# Patient Record
Sex: FEMALE | Race: WHITE | Employment: OTHER | URBAN - METROPOLITAN AREA
[De-identification: names, ages, dates, MRNs, and addresses within clinical notes are randomized per-mention and may not be internally consistent; named-entity substitution may affect disease eponyms.]

---

## 2018-03-19 PROBLEM — E78.2 COMBINED HYPERLIPIDEMIA ASSOCIATED WITH TYPE 2 DIABETES MELLITUS (HCC): Status: ACTIVE | Noted: 2018-03-19

## 2018-03-19 PROBLEM — E11.69 COMBINED HYPERLIPIDEMIA ASSOCIATED WITH TYPE 2 DIABETES MELLITUS (HCC): Status: ACTIVE | Noted: 2018-03-19

## 2020-03-25 PROBLEM — E66.01 MORBIDLY OBESE (HCC): Status: ACTIVE | Noted: 2020-03-25

## 2021-06-25 PROBLEM — I21.11 STEMI INVOLVING RIGHT CORONARY ARTERY (HCC): Status: ACTIVE | Noted: 2021-06-25

## 2021-06-25 PROBLEM — R07.9 CHEST PAIN: Status: ACTIVE | Noted: 2021-06-25

## 2021-06-25 PROBLEM — I30.0 ACUTE IDIOPATHIC PERICARDITIS: Status: ACTIVE | Noted: 2021-06-25

## 2021-06-25 PROBLEM — I30.1 ACUTE VIRAL PERICARDITIS: Status: ACTIVE | Noted: 2021-06-25

## 2021-06-29 PROBLEM — I31.39 PERICARDIAL EFFUSION: Status: ACTIVE | Noted: 2021-06-29

## 2021-06-29 PROBLEM — I30.9 ACUTE PERICARDITIS: Status: ACTIVE | Noted: 2021-06-29

## 2021-06-29 PROBLEM — F41.9 ANXIETY: Status: ACTIVE | Noted: 2021-06-29

## 2021-06-30 PROBLEM — I31.9 PERICARDITIS: Status: ACTIVE | Noted: 2021-06-29

## 2021-09-20 ENCOUNTER — TELEPHONE (OUTPATIENT)
Dept: ADMINISTRATIVE | Age: 66
End: 2021-09-20

## 2021-09-20 NOTE — TELEPHONE ENCOUNTER
Pt called and requested to schedule herself and her son, Hernando Biggs, with [facility]. She said they are previous patients of his from [facility]. Please advise if they require a referral in order to schedule. Also, she would like their appointments back to back if possible. Please advise if able to schedule back to back and if referral is needed, and I will call pt back.

## 2021-10-19 ENCOUNTER — OFFICE VISIT (OUTPATIENT)
Dept: ENDOCRINOLOGY | Age: 66
End: 2021-10-19
Payer: MEDICARE

## 2021-10-19 VITALS
BODY MASS INDEX: 35.51 KG/M2 | DIASTOLIC BLOOD PRESSURE: 78 MMHG | WEIGHT: 193 LBS | OXYGEN SATURATION: 96 % | SYSTOLIC BLOOD PRESSURE: 112 MMHG | HEART RATE: 89 BPM | HEIGHT: 62 IN

## 2021-10-19 DIAGNOSIS — E66.09 CLASS 1 OBESITY DUE TO EXCESS CALORIES WITHOUT SERIOUS COMORBIDITY WITH BODY MASS INDEX (BMI) OF 34.0 TO 34.9 IN ADULT: ICD-10-CM

## 2021-10-19 DIAGNOSIS — E03.9 ACQUIRED HYPOTHYROIDISM: ICD-10-CM

## 2021-10-19 DIAGNOSIS — E11.42 TYPE 2 DIABETES MELLITUS WITH POLYNEUROPATHY (HCC): ICD-10-CM

## 2021-10-19 DIAGNOSIS — I10 ESSENTIAL HYPERTENSION: ICD-10-CM

## 2021-10-19 DIAGNOSIS — E78.2 MIXED HYPERLIPIDEMIA: ICD-10-CM

## 2021-10-19 DIAGNOSIS — E11.65 TYPE 2 DIABETES MELLITUS WITH HYPERGLYCEMIA, WITH LONG-TERM CURRENT USE OF INSULIN (HCC): Primary | ICD-10-CM

## 2021-10-19 DIAGNOSIS — Z79.4 TYPE 2 DIABETES MELLITUS WITH HYPERGLYCEMIA, WITH LONG-TERM CURRENT USE OF INSULIN (HCC): Primary | ICD-10-CM

## 2021-10-19 LAB — HBA1C MFR BLD: 5.7 %

## 2021-10-19 PROCEDURE — 83036 HEMOGLOBIN GLYCOSYLATED A1C: CPT | Performed by: CLINICAL NURSE SPECIALIST

## 2021-10-19 PROCEDURE — G8484 FLU IMMUNIZE NO ADMIN: HCPCS | Performed by: CLINICAL NURSE SPECIALIST

## 2021-10-19 PROCEDURE — 2022F DILAT RTA XM EVC RTNOPTHY: CPT | Performed by: CLINICAL NURSE SPECIALIST

## 2021-10-19 PROCEDURE — 1036F TOBACCO NON-USER: CPT | Performed by: CLINICAL NURSE SPECIALIST

## 2021-10-19 PROCEDURE — G8427 DOCREV CUR MEDS BY ELIG CLIN: HCPCS | Performed by: CLINICAL NURSE SPECIALIST

## 2021-10-19 PROCEDURE — G8417 CALC BMI ABV UP PARAM F/U: HCPCS | Performed by: CLINICAL NURSE SPECIALIST

## 2021-10-19 PROCEDURE — 3017F COLORECTAL CA SCREEN DOC REV: CPT | Performed by: CLINICAL NURSE SPECIALIST

## 2021-10-19 PROCEDURE — 99214 OFFICE O/P EST MOD 30 MIN: CPT | Performed by: CLINICAL NURSE SPECIALIST

## 2021-10-19 PROCEDURE — 3044F HG A1C LEVEL LT 7.0%: CPT | Performed by: CLINICAL NURSE SPECIALIST

## 2021-10-19 RX ORDER — INSULIN GLARGINE 100 [IU]/ML
8 INJECTION, SOLUTION SUBCUTANEOUS NIGHTLY
Qty: 15 PEN | Refills: 3
Start: 2021-10-19 | End: 2022-03-31

## 2021-10-19 NOTE — PROGRESS NOTES
[address] [facility] Department of Endocrinology Diabetes and Metabolism   [address] [facility] [address]   Phone: [phone]  Fax: [phone]    Date of Service: 10/19/2021    Primary Care Physician: Chantal Mustafa MD  Referring physician: No ref. provider found  Provider: Beverly HUMPHREYS    Reason for the visit:      History of Present Illness: The history is provided by the patient. No  was used. Accuracy of the patient data is excellent. Juan Loyd is a very pleasant 61 y.o. female seen today for diabetes management     Juan Loyd was diagnosed with diabetes at age of 30    and currently on metformin 1000 mg twice daily, glipizide 5 mg a morning and 10 mg at dinner, Victoza 1.8 mg daily. Lantus pen 10 units at night   Stop Blair Rosemarie in the past due to UTI. Stopped Actos November 2015  Checks BG 2 times per day   Average 154   The patient has been checking blood sugar    . Most recent A1c results summarized below  Lab Results   Component Value Date    LABA1C 5.7 10/19/2021    LABA1C 6.6 06/25/2021    LABA1C 6.4 06/14/2021     Patient reported infrequent hypoglycemic episodes     The patient has been mindful of what has been eating and following diabetic diet as encouraged    I reviewed current medications and the patient has no issues with diabetes medications   Last eye exam was 5/21  , no h/o diabetic retinopathy  And also performs  own feet care  Microvascular complications:  No Retinopathy, Nephropathy.  +  Neuropathy   Macrovascular complications: no CAD, PVD, or Stroke    No Hx of pancreatitis   No HX of UTI/  She has had 3-4 yeast infections since starting Farxiga. None since stopping farxiga   No hx of MTC  No Hx of gastroparesis       Hypothyroidism-  1st dx at when she was in her 21's. Context:  Severe fatigue. Now on LT4 125 mcg 1 tablet daily. Does takes with other medication. Good compliance.   Pt complaining of denies related to thyroid   Lab Results   Component Value Date    TSH 1.909 02/22/2021    T4FREE 1.55 10/15/2019       PAST MEDICAL HISTORY   Past Medical History:   Diagnosis Date    Acute viral pericarditis     Anxiety     B[facility] disorder ([facility]     Depression     Diabetes ([facility]     GERD (gastroesophageal reflux disease)     Hyperlipidemia     Hypertension     Hypothyroidism     Urinary incontinence        PAST SURGICAL HISTORY   Past Surgical History:   Procedure Laterality Date    CATARACT REMOVAL Bilateral     EYE SURGERY Left     TOOTH EXTRACTION         SOCIAL HISTORY   Tobacco:   reports that she has never smoked. She has never used smokeless tobacco.  Alcohol:   reports no history of alcohol use. Drugs:   reports no history of drug use.     FAMILY HISTORY   Family History   Problem Relation Age of Onset    Diabetes Mother     Heart Disease Mother     Thyroid Disease Mother     Cancer Father     Cancer Sister     Diabetes Brother        ALLERGIES AND DRUG REACTIONS   Allergies   Allergen Reactions    Colchicine Other (See Comments)     Chest pain and discomfort and throat and stomach pain - reported by patient 9/7/21    Gabapentin      \"they told me not to take it because it gives me CP\"       CURRENT MEDICATIONS   Current Outpatient Medications   Medication Sig Dispense Refill    insulin glargine (LANTUS SOLOSTAR) 100 UNIT/ML injection pen Inject 8 Units into the skin nightly 15 pen 3    VICTOZA 18 MG/3ML SOPN SC injection inject 1.8 milligram subcutaneously daily 9 mL 3    levothyroxine (SYNTHROID) 125 MCG tablet take 1 tablet by mouth once daily 90 tablet 1    glipiZIDE (GLUCOTROL) 5 MG tablet take 1 tablet by mouth WITH BREAKFAST AND 2 TABLETS WITH DINNER 270 tablet 2    metFORMIN (GLUCOPHAGE) 1000 MG tablet TAKE 1 TABLET BY MOUTH TWICE DAILY WITH MEALS 180 tablet 3    ibuprofen (ADVIL;MOTRIN) 600 MG tablet Take 1 tablet by mouth every 6 hours as needed for Pain 90 tablet 2    zinc gluconate 50 MG tablet Take 50 mg by mouth daily      Ascorbic Acid (VITAMIN C PO) Take 500 mg by mouth      esomeprazole (NEXIUM) 40 MG delayed release capsule take 1 capsule by mouth once daily 90 capsule 1    oxybutynin (DITROPAN) 5 MG tablet take 1 tablet by mouth every morning then take 2 tablets at bedtime 270 tablet 1    captopril (CAPOTEN) 50 MG tablet take 1 tablet by mouth twice a day 180 tablet 1    atorvastatin (LIPITOR) 20 MG tablet Take 1 tablet by mouth daily 90 tablet 1    tiZANidine (ZANAFLEX) 2 MG tablet take 1 tablet by mouth nightly if needed for muscle spasm 90 tablet 1    DULoxetine (CYMBALTA) 60 MG extended release capsule take 1 capsule by mouth twice a day 180 capsule 1    citalopram (CELEXA) 20 MG tablet take 1 tablet by mouth once daily 90 tablet 1    blood glucose test strips (FREESTYLE LITE) strip TEST three times DAILY 300 strip 3    Misc.  Devices (ROLLATOR ULTRA-LIGHT) MISC 1 each by Does not apply route daily 1 each 0    magnesium oxide (MAG-OX) 400 (240 Mg) MG tablet Take 1 tablet by mouth daily 90 tablet 1    ferrous sulfate (IRON 325) 325 (65 Fe) MG tablet Take 1 tablet by mouth daily (with breakfast) 90 tablet 3    sucralfate (CARAFATE) 1 GM/10ML suspension Take 10 mLs by mouth 4 times daily 1200 mL 3    aspirin 81 MG chewable tablet Take 1 tablet by mouth daily 30 tablet 3    Continuous Blood Gluc Sensor (FREESTYLE SHAGUFTA 2 SENSOR) MISC 1 applicator by Does not apply route every 14 days (Patient not taking: Reported on 10/19/2021) 2 each 3    Insulin Pen Needle (EASY TOUCH PEN NEEDLES) 31G X 5 MM MISC USE DAILY AS DIRECTED 200 each 3    cyanocobalamin ([employer] VITAMIN B12) 1000 MCG tablet Take 1 tablet by mouth daily 90 tablet 3    Handicap Placard MISC by Does not apply route DURATION: 5 years 1 each 0    Blood Glucose Monitoring Suppl (FREESTYLE FREEDOM LITE) w/Device KIT 1 kit by Does not apply route daily 1 kit 1    fexofenadine (ALLEGRA ALLERGY) 60 MG tablet Take 1 tablet by mouth daily 90 tablet 1     No current facility-administered medications for this visit. Review of Systems  Constitutional: No fever, no chills, no diaphoresis, no generalized weakness. HEENT: No blurred vision, No sore throat, no ear pain, no hair loss  Neck: denied any neck swelling, difficulty swallowing,   Cardio-pulmonary: No CP, SOB or palpitation, No orthopnea or PND. No cough or wheezing. GI: No N/V/D, no constipation, No abdominal pain, no melena or hematochezia   : Denied any dysuria, hematuria, flank pain, discharge, or incontinence. Skin: denied any rash, ulcer, Hirsute, or hyperpigmentation. MSK: denied any joint deformity, joint pain/swelling, muscle pain, or back pain. Neuro: no numbness, no tingling, no weakness, _    OBJECTIVE    /78   Pulse 89   Ht 5' 2\" (1.575 m)   Wt 193 lb (87.5 kg)   SpO2 96%   BMI 35.30 kg/m²   BP Readings from Last 4 Encounters:   10/19/21 112/78   10/03/21 131/84   09/30/21 124/84   09/15/21 122/73     Wt Readings from Last 6 Encounters:   10/19/21 193 lb (87.5 kg)   10/03/21 190 lb (86.2 kg)   09/30/21 187 lb 3.2 oz (84.9 kg)   09/07/21 191 lb (86.6 kg)   09/02/21 189 lb 6.4 oz (85.9 kg)   08/29/21 193 lb (87.5 kg)       Physical examination:  General: awake alert, oriented x3, no abnormal position or movements. HEENT: normocephalic non-traumatic, no exophthalmos   Neck: supple, no LN enlargement, no thyromegaly, no thyroid tenderness, no JVD. Pulm: Clear equal air entry no added sounds, no wheezing or rhonchi    CVS: S1 + S2, no murmur, no heave. Dorsalis pedis pulse palpable   Abd: soft lax, no tenderness, no organomegaly, audible bowel sounds. Skin: warm, no lesions, no rash. No callus, no Ulcers, No acanthosis nigricans  Musculoskeletal: No back tenderness, no kyphosis/scoliosis    Neuro: CN intact, Monofilament sensation normal  bilateral , muscle power normal  Psych: normal mood, and affect      Review of Laboratory Data:   I personally reviewed the following lab:  Lab Results   Component Value Date/Time    WBC 12.3 (H) 10/04/2021 12:18 AM    RBC 4.49 10/04/2021 12:18 AM    HGB 13.0 10/04/2021 12:18 AM    HCT 37.9 10/04/2021 12:18 AM    MCV 84.5 10/04/2021 12:18 AM    MCH 28.9 10/04/2021 12:18 AM    MCHC 34.2 10/04/2021 12:18 AM    RDW 17.5 (H) 10/04/2021 12:18 AM     10/04/2021 12:18 AM    MPV 6.8 (L) 10/04/2021 12:18 AM    GRANULOCYTES 83.2 (H) 10/04/2021 12:18 AM    BANDS 1 08/10/2021 03:31 PM      Lab Results   Component Value Date/Time     (L) 10/04/2021 12:18 AM    K 4.2 10/04/2021 12:18 AM    CO2 24 10/04/2021 12:18 AM    BUN 15 10/04/2021 12:18 AM    CREATININE 0.59 10/04/2021 12:18 AM    CALCIUM 9.8 10/04/2021 12:18 AM    LABGLOM >60 02/04/2016 10:55 AM    GFRAA >60 02/04/2016 10:55 AM      Lab Results   Component Value Date/Time    TSH 1.909 02/22/2021 09:52 AM    T4FREE 1.55 10/15/2019 02:35 PM     Lab Results   Component Value Date    LABA1C 5.7 10/19/2021    GLUCOSE 145 10/04/2021    MALBCR see below 02/22/2021    LABMICR 1.80 02/04/2016    LABCREA 48 02/04/2016     Lab Results   Component Value Date    LABA1C 5.7 10/19/2021    LABA1C 6.6 06/25/2021    LABA1C 6.4 06/14/2021     Lab Results   Component Value Date    TRIG 144 06/25/2021    HDL 38 06/25/2021    LDLCALC 86 02/04/2016    CHOL 137 06/25/2021    CHOL 160 02/04/2016     Lab Results   Component Value Date    VITD25 36.60 04/14/2016       ASSESSMENT & RECOMMENDATIONS   Josef Ada, a 61 y.o.-old female seen in for the following issues       Assessment:      Diagnosis Orders   1. Type 2 diabetes mellitus with hyperglycemia, with long-term current use of insulin (HCC)  insulin glargine (LANTUS SOLOSTAR) 100 UNIT/ML injection pen    POCT glycosylated hemoglobin (Hb A1C)   2. Acquired hypothyroidism     3. Class 1 obesity due to excess calories without serious comorbidity with body mass index (BMI) of 34.0 to 34.9 in adult     4.  Mixed hyperlipidemia 5. Essential hypertension     6. Type 2 diabetes mellitus with polyneuropathy ([facility]         Plan:     1. Type 2 diabetes mellitus with hyperglycemia, with long-term current use of insulin (HCC)   · Patient's diabetes irritable but usually stable. Hemoglobin A1c 5.7%. · Plan: Decrease Lantus to 8 units at night  · Continue other medication as above  · The patient was advised to check blood sugars 2 times a day   · Advised to call if blood glucose less than 70 or greater than 300 3 consecutive times  · Discussed with patient A1c and blood sugar goals   · Optimal blood sugars: 100-140 pre-prandial, < 180 peak post-prandial  · The patient counseled about the complications of uncontrolled diabetes   · Patient was counselled about the importance of self-blood glucose monitoring and eating consistent carb diet to avoid blood sugar fluctuations   · Discussed lifestyle changes including diet and exercise with patient; recommended 150 minutes of moderate intensity exercise per week. 2. Acquired hypothyroidism   Continue levothyroxine 125 mcg 1 tablet once daily. Last thyroid function within normal limits. Patient taking on an empty stomach least 30 minutes before breakfast and without combination of other medications   3. Class 1 obesity due to excess calories without serious comorbidity with body mass index (BMI) of 34.0 to 34.9 in adult   Discussed lifestyle changes including diet and exercise with patient in depth. Also discussed with patient cardiovascular risk associated with obesity   4. Mixed hyperlipidemia   Continue statin   5. Essential hypertension   BP stable following with PCP   6. Type 2 diabetes mellitus with polyneuropathy ([facility]   Counseled on optimal foot care         I personally spent greater than 30 minutes reviewing  external notes from PCP and other patient's care team providers, and personally interpreted labs associated with the above diagnosis.  I also ordered labs to further assess and

## 2021-12-06 DIAGNOSIS — E11.65 TYPE 2 DIABETES MELLITUS WITH HYPERGLYCEMIA, WITH LONG-TERM CURRENT USE OF INSULIN (HCC): ICD-10-CM

## 2021-12-06 DIAGNOSIS — Z79.4 TYPE 2 DIABETES MELLITUS WITH HYPERGLYCEMIA, WITH LONG-TERM CURRENT USE OF INSULIN (HCC): ICD-10-CM

## 2021-12-06 RX ORDER — BLOOD-GLUCOSE METER
KIT MISCELLANEOUS
Qty: 300 STRIP | Refills: 5 | Status: SHIPPED
Start: 2021-12-06 | End: 2021-12-13

## 2021-12-13 DIAGNOSIS — Z79.4 TYPE 2 DIABETES MELLITUS WITH HYPERGLYCEMIA, WITH LONG-TERM CURRENT USE OF INSULIN (HCC): Primary | ICD-10-CM

## 2021-12-13 DIAGNOSIS — E11.65 TYPE 2 DIABETES MELLITUS WITH HYPERGLYCEMIA, WITH LONG-TERM CURRENT USE OF INSULIN (HCC): Primary | ICD-10-CM

## 2021-12-13 RX ORDER — LANCETS 30 GAUGE
EACH MISCELLANEOUS
Qty: 150 EACH | Refills: 6 | Status: SHIPPED
Start: 2021-12-13 | End: 2022-10-18

## 2021-12-13 RX ORDER — BLOOD-GLUCOSE METER
EACH MISCELLANEOUS
Qty: 1 KIT | Refills: 0 | Status: SHIPPED | OUTPATIENT
Start: 2021-12-13

## 2021-12-13 NOTE — TELEPHONE ENCOUNTER
Patient requested through my chart, her strips no longer are covered. I called pharmacy and they stated to send in new script for Verio one touch.

## 2022-03-14 DIAGNOSIS — E11.65 TYPE 2 DIABETES MELLITUS WITH HYPERGLYCEMIA, WITH LONG-TERM CURRENT USE OF INSULIN (HCC): ICD-10-CM

## 2022-03-14 DIAGNOSIS — Z79.4 TYPE 2 DIABETES MELLITUS WITH HYPERGLYCEMIA, WITH LONG-TERM CURRENT USE OF INSULIN (HCC): ICD-10-CM

## 2022-03-14 RX ORDER — LIRAGLUTIDE 6 MG/ML
INJECTION SUBCUTANEOUS
Qty: 9 ML | Refills: 3 | Status: SHIPPED
Start: 2022-03-14 | End: 2022-07-08

## 2022-03-31 ENCOUNTER — OFFICE VISIT (OUTPATIENT)
Dept: ENDOCRINOLOGY | Age: 67
End: 2022-03-31
Payer: MEDICARE

## 2022-03-31 VITALS
HEIGHT: 62 IN | DIASTOLIC BLOOD PRESSURE: 81 MMHG | SYSTOLIC BLOOD PRESSURE: 123 MMHG | BODY MASS INDEX: 36.25 KG/M2 | WEIGHT: 197 LBS | HEART RATE: 72 BPM | OXYGEN SATURATION: 97 % | RESPIRATION RATE: 18 BRPM

## 2022-03-31 DIAGNOSIS — E78.2 MIXED HYPERLIPIDEMIA: ICD-10-CM

## 2022-03-31 DIAGNOSIS — E03.9 ACQUIRED HYPOTHYROIDISM: ICD-10-CM

## 2022-03-31 DIAGNOSIS — E11.65 TYPE 2 DIABETES MELLITUS WITH HYPERGLYCEMIA, WITH LONG-TERM CURRENT USE OF INSULIN (HCC): Primary | ICD-10-CM

## 2022-03-31 DIAGNOSIS — E11.42 TYPE 2 DIABETES MELLITUS WITH POLYNEUROPATHY (HCC): ICD-10-CM

## 2022-03-31 DIAGNOSIS — Z79.4 TYPE 2 DIABETES MELLITUS WITH HYPERGLYCEMIA, WITH LONG-TERM CURRENT USE OF INSULIN (HCC): Primary | ICD-10-CM

## 2022-03-31 DIAGNOSIS — I10 ESSENTIAL HYPERTENSION: ICD-10-CM

## 2022-03-31 DIAGNOSIS — E66.09 CLASS 2 OBESITY DUE TO EXCESS CALORIES WITHOUT SERIOUS COMORBIDITY WITH BODY MASS INDEX (BMI) OF 35.0 TO 35.9 IN ADULT: ICD-10-CM

## 2022-03-31 LAB — HBA1C MFR BLD: 5.5 %

## 2022-03-31 PROCEDURE — 99214 OFFICE O/P EST MOD 30 MIN: CPT | Performed by: CLINICAL NURSE SPECIALIST

## 2022-03-31 PROCEDURE — 83036 HEMOGLOBIN GLYCOSYLATED A1C: CPT | Performed by: CLINICAL NURSE SPECIALIST

## 2022-03-31 PROCEDURE — 3017F COLORECTAL CA SCREEN DOC REV: CPT | Performed by: CLINICAL NURSE SPECIALIST

## 2022-03-31 PROCEDURE — G8427 DOCREV CUR MEDS BY ELIG CLIN: HCPCS | Performed by: CLINICAL NURSE SPECIALIST

## 2022-03-31 PROCEDURE — 3044F HG A1C LEVEL LT 7.0%: CPT | Performed by: CLINICAL NURSE SPECIALIST

## 2022-03-31 PROCEDURE — G8484 FLU IMMUNIZE NO ADMIN: HCPCS | Performed by: CLINICAL NURSE SPECIALIST

## 2022-03-31 PROCEDURE — G8417 CALC BMI ABV UP PARAM F/U: HCPCS | Performed by: CLINICAL NURSE SPECIALIST

## 2022-03-31 PROCEDURE — 2022F DILAT RTA XM EVC RTNOPTHY: CPT | Performed by: CLINICAL NURSE SPECIALIST

## 2022-03-31 PROCEDURE — 1036F TOBACCO NON-USER: CPT | Performed by: CLINICAL NURSE SPECIALIST

## 2022-03-31 RX ORDER — GLIPIZIDE 5 MG/1
TABLET ORAL
Qty: 180 TABLET | Refills: 2 | Status: SHIPPED
Start: 2022-03-31 | End: 2022-10-21

## 2022-03-31 NOTE — PROGRESS NOTES
[address] [place] [address] Department of Endocrinology Diabetes and Metabolism   [address] [facility] [address]   Phone: [phone]  Fax: [phone]    Date of Service: 3/31/2022    Primary Care Physician: Alcira Cline MD  Referring physician: No ref. provider found  Provider: Mario HUMPHREYS    Reason for the visit:      History of Present Illness: The history is provided by the patient. No  was used. Accuracy of the patient data is excellent. Stephenie Loomis is a very pleasant 61 y.o. female seen today for diabetes management     Stephenie Loomis was diagnosed with diabetes at age of 30    and currently on metformin 1000 mg twice daily, glipizide 5 mg a morning and 10 mg at dinner, Victoza 1.8 mg daily. Lantus pen 5 units at night   Stop Clifm [place] in the past due to UTI. Stopped Actos November 2015  Checks BG 2 times per day   Average 123 . Most recent A1c results summarized below  Lab Results   Component Value Date    LABA1C 5.5 03/31/2022    LABA1C 5.7 10/19/2021    LABA1C 6.6 06/25/2021     Patient reported infrequent hypoglycemic episodes usually in am      The patient has been mindful of what has been eating and following diabetic diet as encouraged    I reviewed current medications and the patient has no issues with diabetes medications   Last eye exam was 5/21  , no h/o diabetic retinopathy  And also performs  own feet care  Microvascular complications:  No Retinopathy, Nephropathy.  +  Neuropathy   Macrovascular complications: no CAD, PVD, or Stroke    No Hx of pancreatitis   No HX of UTI/  She has had 3-4 yeast infections since starting Farxiga. None since stopping farxiga   No hx of MTC  No Hx of gastroparesis       Hypothyroidism-  1st dx at when she was in her 19's. Context:  Severe fatigue. Now on LT4 125 mcg 1 tablet daily. Does takes with other medication. Good compliance.   Pt complaining of denies related to thyroid   Lab Results Component Value Date    TSH 1.909 02/22/2021    T4FREE 1.55 10/15/2019       PAST MEDICAL HISTORY   Past Medical History:   Diagnosis Date    Acute viral pericarditis     Anxiety     Bipolar disorder ([facility]     Depression     Diabetes ([facility]     GERD (gastroesophageal reflux disease)     Hyperlipidemia     Hypertension     Hypothyroidism     Urinary incontinence        PAST SURGICAL HISTORY   Past Surgical History:   Procedure Laterality Date    CATARACT REMOVAL Bilateral     EYE SURGERY Left     TOOTH EXTRACTION         SOCIAL HISTORY   Tobacco:   reports that she has never smoked. She has never used smokeless tobacco.  Alcohol:   reports no history of alcohol use. Drugs:   reports no history of drug use.     FAMILY HISTORY   Family History   Problem Relation Age of Onset    Diabetes Mother     Heart Disease Mother     Thyroid Disease Mother     Cancer Father     Cancer Sister     Diabetes Brother        ALLERGIES AND DRUG REACTIONS   Allergies   Allergen Reactions    Colchicine Other (See Comments)     Chest pain and discomfort and throat and stomach pain - reported by patient 9/7/21    Gabapentin      \"they told me not to take it because it gives me CP\"       CURRENT MEDICATIONS   Current Outpatient Medications   Medication Sig Dispense Refill    glipiZIDE (GLUCOTROL) 5 MG tablet take 1 tablet by mouth WITH BREAKFAST AND 1 TABLET WITH DINNER 180 tablet 2    metFORMIN (GLUCOPHAGE) 1000 MG tablet TAKE 1 TABLET BY MOUTH TWICE DAILY WITH MEALS 180 tablet 3    citalopram (CELEXA) 20 MG tablet take 1 tablet by mouth once daily 90 tablet 1    DULoxetine (CYMBALTA) 60 MG extended release capsule take 1 capsule by mouth twice a day 180 capsule 1    Liraglutide (VICTOZA) 18 MG/3ML SOPN SC injection Inject 1.8 milligrams subcutaneously daily 9 mL 3    oxybutynin (DITROPAN) 5 MG tablet take 1 tablet by mouth every morning then take 2 tablets at bedtime 270 tablet 1    FEROSUL 325 (65 Fe) MG tablet take 1 tablet by mouth once daily WITH BREAKFAST 90 tablet 3    magnesium oxide (MAG-OX) 400 MG tablet take 1 tablet by mouth once daily 90 tablet 1    Blood Glucose Monitoring Suppl (ONETOUCH VERIO) w/Device KIT Use to check blood sugars 4x daily 1 kit 0    blood glucose test strips (ASCENSIA AUTODISC VI;ONE TOUCH ULTRA TEST VI) strip 1 each by In Vitro route daily Use to check blood sugars 4x daily 150 each 6    OneTouch Delica Lancets 46U MISC Use to check blood sugars 4x daily 150 each 6    atorvastatin (LIPITOR) 20 MG tablet TAKE 1 TABLET BY MOUTH  DAILY 90 tablet 3    captopril (CAPOTEN) 50 MG tablet take 1 tablet by mouth twice a day 180 tablet 1    zinc gluconate 50 MG tablet Take 50 mg by mouth daily      Ascorbic Acid (VITAMIN C PO) Take 500 mg by mouth      esomeprazole (NEXIUM) 40 MG delayed release capsule take 1 capsule by mouth once daily 90 capsule 1    tiZANidine (ZANAFLEX) 2 MG tablet take 1 tablet by mouth nightly if needed for muscle spasm 90 tablet 1    levothyroxine (SYNTHROID) 125 MCG tablet take 1 tablet by mouth once daily 90 tablet 1    Misc.  Devices (ROLLATOR ULTRA-LIGHT) MISC 1 each by Does not apply route daily 1 each 0    sucralfate (CARAFATE) 1 GM/10ML suspension Take 10 mLs by mouth 4 times daily 1200 mL 3    aspirin 81 MG chewable tablet Take 1 tablet by mouth daily 30 tablet 3    Continuous Blood Gluc Sensor (FREESTYLE SHAGUFTA 2 SENSOR) MISC 1 applicator by Does not apply route every 14 days 2 each 3    Insulin Pen Needle (EASY TOUCH PEN NEEDLES) 31G X 5 MM MISC USE DAILY AS DIRECTED 200 each 3    cyanocobalamin ([employer] VITAMIN B12) 1000 MCG tablet Take 1 tablet by mouth daily 90 tablet 3    Handicap Placard MISC by Does not apply route DURATION: 5 years 1 each 0    Blood Glucose Monitoring Suppl (FREESTYLE FREEDOM LITE) w/Device KIT 1 kit by Does not apply route daily 1 kit 1    fexofenadine (ALLEGRA ALLERGY) 60 MG tablet Take 1 tablet by mouth daily 90 tablet 1    ibuprofen (ADVIL;MOTRIN) 400 MG tablet TAKE 1 TABLET BY MOUTH  DAILY 90 tablet 3    ibuprofen (ADVIL;MOTRIN) 600 MG tablet Take 1 tablet by mouth every 6 hours as needed for Pain 90 tablet 2     No current facility-administered medications for this visit. Review of Systems  Constitutional: No fever, no chills, no diaphoresis, no generalized weakness. HEENT: No blurred vision, No sore throat, no ear pain, no hair loss  Neck: denied any neck swelling, difficulty swallowing,   Cardio-pulmonary: No CP, SOB or palpitation, No orthopnea or PND. No cough or wheezing. GI: No N/V/D, no constipation, No abdominal pain, no melena or hematochezia   : Denied any dysuria, hematuria, flank pain, discharge, or incontinence. Skin: denied any rash, ulcer, Hirsute, or hyperpigmentation. MSK: denied any joint deformity, joint pain/swelling, muscle pain, or back pain. Neuro: no numbness, no tingling, no weakness, _    OBJECTIVE    /81   Pulse 72   Resp 18   Ht 5' 2\" (1.575 m)   Wt 197 lb (89.4 kg)   SpO2 97%   BMI 36.03 kg/m²   BP Readings from Last 4 Encounters:   03/31/22 123/81   01/06/22 137/71   11/22/21 118/75   10/19/21 112/78     Wt Readings from Last 6 Encounters:   03/31/22 197 lb (89.4 kg)   01/06/22 193 lb (87.5 kg)   11/22/21 193 lb (87.5 kg)   10/19/21 193 lb (87.5 kg)   10/03/21 190 lb (86.2 kg)   09/30/21 187 lb 3.2 oz (84.9 kg)       Physical examination:  General: awake alert, oriented x3, no abnormal position or movements. HEENT: normocephalic non-traumatic, no exophthalmos   Neck: supple, no LN enlargement, no thyromegaly, no thyroid tenderness, no JVD. Pulm: Clear equal air entry no added sounds, no wheezing or rhonchi    CVS: S1 + S2, no murmur, no heave. Dorsalis pedis pulse palpable   Abd: soft lax, no tenderness, no organomegaly, audible bowel sounds. Skin: warm, no lesions, no rash.  No callus, no Ulcers, No acanthosis nigricans  Musculoskeletal: No back tenderness, no kyphosis/scoliosis    Neuro: CN intact, Monofilament sensation normal  bilateral , muscle power normal  Psych: normal mood, and affect      Review of Laboratory Data:  I personally reviewed the following lab:  Lab Results   Component Value Date/Time    WBC 8.8 01/05/2022 02:32 PM    RBC 4.45 01/05/2022 02:32 PM    HGB 13.0 01/05/2022 02:32 PM    HCT 38.8 01/05/2022 02:32 PM    MCV 87.1 01/05/2022 02:32 PM    MCH 29.1 01/05/2022 02:32 PM    MCHC 33.4 01/05/2022 02:32 PM    RDW 16.9 (H) 01/05/2022 02:32 PM     01/05/2022 02:32 PM    MPV 7.2 (L) 01/05/2022 02:32 PM    GRANULOCYTES 79.9 01/05/2022 02:32 PM    BANDS 1 08/10/2021 03:31 PM      Lab Results   Component Value Date/Time     (L) 01/05/2022 02:32 PM    K 4.7 01/13/2022 10:29 AM    CO2 27 01/05/2022 02:32 PM    BUN 18 01/05/2022 02:32 PM    CREATININE 0.62 01/05/2022 02:32 PM    CALCIUM 9.9 01/05/2022 02:32 PM    LABGLOM >60 02/04/2016 10:55 AM    GFRAA >60 02/04/2016 10:55 AM      Lab Results   Component Value Date/Time    TSH 1.909 02/22/2021 09:52 AM    T4FREE 1.55 10/15/2019 02:35 PM     Lab Results   Component Value Date    LABA1C 5.5 03/31/2022    GLUCOSE 143 01/05/2022    MALBCR see below 02/22/2021    LABMICR 1.80 02/04/2016    LABCREA 48 02/04/2016     Lab Results   Component Value Date    LABA1C 5.5 03/31/2022    LABA1C 5.7 10/19/2021    LABA1C 6.6 06/25/2021     Lab Results   Component Value Date    TRIG 144 06/25/2021    HDL 38 06/25/2021    LDLCALC 86 02/04/2016    CHOL 137 06/25/2021    CHOL 160 02/04/2016     Lab Results   Component Value Date    VITD25 36.60 04/14/2016       ASSESSMENT & RECOMMENDATIONS   Red Dominguez, a 61 y.o.-old female seen in for the following issues       Assessment:      Diagnosis Orders   1. Type 2 diabetes mellitus with hyperglycemia, with long-term current use of insulin (HCC)  POCT glycosylated hemoglobin (Hb A1C)    glipiZIDE (GLUCOTROL) 5 MG tablet    metFORMIN (GLUCOPHAGE) 1000 MG tablet   2. Acquired hypothyroidism     3. Class 2 obesity due to excess calories without serious comorbidity with body mass index (BMI) of 35.0 to 35.9 in adult     4. Mixed hyperlipidemia     5. Essential hypertension     6. Type 2 diabetes mellitus with polyneuropathy ([facility]         Plan:     1. Type 2 diabetes mellitus with hyperglycemia, with long-term current use of insulin ([facility]   · Patient's diabetes well controlled . Hemoglobin A1c 5.5%. · Plan: Stop Lantus   · Decrease glipizide dose to 5 mg 1 tablet twice daily   · Continue metformin 1000 mg twice daily  · Continue Victoza 1.8 mg daily  · The patient was advised to check blood sugars 2 times a day   · Advised to call if blood glucose less than 70 or greater than 300 3 consecutive times  · Discussed with patient A1c and blood sugar goals   · Discussed lifestyle changes including diet and exercise with patient; recommended 150 minutes of moderate intensity exercise per week. 2. Acquired hypothyroidism   Continue levothyroxine 125 mcg 1 tablet once daily. Last thyroid function within normal limits. Patient taking on an empty stomach least 30 minutes before breakfast and without combination of other medications   3. Class 1 obesity due to excess calories without serious comorbidity with body mass index (BMI) of 35.0 to 35.9 in adult   Discussed lifestyle changes including diet and exercise with patient in depth. Also discussed with patient cardiovascular risk associated with obesity   4. Mixed hyperlipidemia   Continue statin. Counseled on the importance of statin therapy with diabetes   5. Essential hypertension   BP stable following with PCP   6. Type 2 diabetes mellitus with polyneuropathy ([facility]   Counseled on optimal foot care         I personally spent greater than 30 minutes reviewing  external notes from PCP and other patient's care team providers, and personally interpreted labs associated with the above diagnosis.  I also ordered labs to further assess and manage the above addressed medical conditions. Return in about 3 months (around 6/30/2022). The above issues were reviewed with the patient who understood and agreed with the plan. Thank you for allowing us to participate in the care of this patient. Please do not hesitate to contact us with any additional questions. Yen HUMPHREYS    [facility]   [address] [place] [id number]   Phone: [phone]  Fax: [phone]  --------------------------------------------  An electronic signature was used to authenticate this note.  Yen HUMPHREYS on 3/31/2022 at 2:34 PM

## 2022-05-05 ENCOUNTER — TELEPHONE (OUTPATIENT)
Dept: ENDOCRINOLOGY | Age: 67
End: 2022-05-05

## 2022-05-05 NOTE — TELEPHONE ENCOUNTER
Alison Zarate called in asking Deya Peña to fill all her prescription due to her PCP changing practices and her needing to find a new PCP. Deya Peña asked me to relay to her that he can only refill the medications he sees her for for diabetes. She said okay and I went over her diabetic medications and she has refills for next several months.

## 2022-07-07 DIAGNOSIS — E11.65 TYPE 2 DIABETES MELLITUS WITH HYPERGLYCEMIA, WITH LONG-TERM CURRENT USE OF INSULIN (HCC): ICD-10-CM

## 2022-07-07 DIAGNOSIS — Z79.4 TYPE 2 DIABETES MELLITUS WITH HYPERGLYCEMIA, WITH LONG-TERM CURRENT USE OF INSULIN (HCC): ICD-10-CM

## 2022-07-08 RX ORDER — LIRAGLUTIDE 6 MG/ML
INJECTION SUBCUTANEOUS
Qty: 9 ML | Refills: 3 | Status: SHIPPED
Start: 2022-07-08 | End: 2022-11-02

## 2022-10-05 ENCOUNTER — OFFICE VISIT (OUTPATIENT)
Dept: ENDOCRINOLOGY | Age: 67
End: 2022-10-05
Payer: MEDICARE

## 2022-10-05 VITALS
BODY MASS INDEX: 36.62 KG/M2 | DIASTOLIC BLOOD PRESSURE: 81 MMHG | WEIGHT: 199 LBS | HEART RATE: 104 BPM | HEIGHT: 62 IN | SYSTOLIC BLOOD PRESSURE: 124 MMHG | OXYGEN SATURATION: 96 %

## 2022-10-05 DIAGNOSIS — E66.09 CLASS 2 OBESITY DUE TO EXCESS CALORIES WITHOUT SERIOUS COMORBIDITY WITH BODY MASS INDEX (BMI) OF 36.0 TO 36.9 IN ADULT: ICD-10-CM

## 2022-10-05 DIAGNOSIS — Z79.4 TYPE 2 DIABETES MELLITUS WITH HYPERGLYCEMIA, WITH LONG-TERM CURRENT USE OF INSULIN (HCC): Primary | ICD-10-CM

## 2022-10-05 DIAGNOSIS — E03.9 ACQUIRED HYPOTHYROIDISM: ICD-10-CM

## 2022-10-05 DIAGNOSIS — I10 ESSENTIAL HYPERTENSION: ICD-10-CM

## 2022-10-05 DIAGNOSIS — E11.42 TYPE 2 DIABETES MELLITUS WITH POLYNEUROPATHY (HCC): ICD-10-CM

## 2022-10-05 DIAGNOSIS — E11.65 TYPE 2 DIABETES MELLITUS WITH HYPERGLYCEMIA, WITH LONG-TERM CURRENT USE OF INSULIN (HCC): Primary | ICD-10-CM

## 2022-10-05 DIAGNOSIS — E78.2 MIXED HYPERLIPIDEMIA: ICD-10-CM

## 2022-10-05 LAB — HBA1C MFR BLD: 6 %

## 2022-10-05 PROCEDURE — G8400 PT W/DXA NO RESULTS DOC: HCPCS | Performed by: CLINICAL NURSE SPECIALIST

## 2022-10-05 PROCEDURE — 1090F PRES/ABSN URINE INCON ASSESS: CPT | Performed by: CLINICAL NURSE SPECIALIST

## 2022-10-05 PROCEDURE — G8417 CALC BMI ABV UP PARAM F/U: HCPCS | Performed by: CLINICAL NURSE SPECIALIST

## 2022-10-05 PROCEDURE — 83036 HEMOGLOBIN GLYCOSYLATED A1C: CPT | Performed by: CLINICAL NURSE SPECIALIST

## 2022-10-05 PROCEDURE — 1036F TOBACCO NON-USER: CPT | Performed by: CLINICAL NURSE SPECIALIST

## 2022-10-05 PROCEDURE — 3017F COLORECTAL CA SCREEN DOC REV: CPT | Performed by: CLINICAL NURSE SPECIALIST

## 2022-10-05 PROCEDURE — 99214 OFFICE O/P EST MOD 30 MIN: CPT | Performed by: CLINICAL NURSE SPECIALIST

## 2022-10-05 PROCEDURE — G8484 FLU IMMUNIZE NO ADMIN: HCPCS | Performed by: CLINICAL NURSE SPECIALIST

## 2022-10-05 PROCEDURE — 3044F HG A1C LEVEL LT 7.0%: CPT | Performed by: CLINICAL NURSE SPECIALIST

## 2022-10-05 PROCEDURE — 1123F ACP DISCUSS/DSCN MKR DOCD: CPT | Performed by: CLINICAL NURSE SPECIALIST

## 2022-10-05 PROCEDURE — 2022F DILAT RTA XM EVC RTNOPTHY: CPT | Performed by: CLINICAL NURSE SPECIALIST

## 2022-10-05 PROCEDURE — G8427 DOCREV CUR MEDS BY ELIG CLIN: HCPCS | Performed by: CLINICAL NURSE SPECIALIST

## 2022-10-05 NOTE — PROGRESS NOTES
[address] [facility] Department of Endocrinology Diabetes and Metabolism   [address] [facility] [address]   Phone: [phone]  Fax: [phone]    Date of Service: 10/5/2022    Primary Care Physician: Michelle Berman MD  Referring physician: No ref. provider found  Provider: Monie HUMPHREYS    Reason for the visit:  Type 2 DM       History of Present Illness: The history is provided by the patient. No  was used. Accuracy of the patient data is excellent. Lucille Shrestha is a very pleasant 74 y.o. female seen today for diabetes management     Lucille Shrestha was diagnosed with diabetes at age of 30    and currently on metformin 1000 mg twice daily, glipizide 5 mg a morning and 5mg at dinner, Victoza 1.8 mg daily. Stop Diannah [place] in the past due to UTI. Stopped Actos November 2015  Checks BG 2 times per day   Bg usually stable  . Most recent A1c results summarized below  Lab Results   Component Value Date/Time    LABA1C 6.0 10/05/2022 02:42 PM    LABA1C 5.5 03/31/2022 02:05 PM    LABA1C 5.7 10/19/2021 01:53 PM     Patient reported no hypoglycemia      The patient has been mindful of what has been eating and following diabetic diet as encouraged    I reviewed current medications and the patient has no issues with diabetes medications   Last eye exam was 2022  , no h/o diabetic retinopathy  And also performs  own feet care  Microvascular complications:  No Retinopathy, Nephropathy.  +  Neuropathy   Macrovascular complications: no CAD, PVD, or Stroke    No Hx of pancreatitis   No HX of UTI/  She has had 3-4 yeast infections since starting Farxiga. None since stopping farxiga   No hx of MTC  No Hx of gastroparesis       Hypothyroidism-  1st dx at when she was in her 21's. Context:  Severe fatigue. Now on LT4 125 mcg 1 tablet daily. Does takes with other medication. Good compliance.   Pt complaining of denies related to thyroid   Lab Results   Component Value Date    TSH 1.073 05/06/2022    T4FREE 1.55 10/15/2019       PAST MEDICAL HISTORY   Past Medical History:   Diagnosis Date    Acute viral pericarditis     Anxiety     Bipolar disorder ([facility] [address]     Depression     Diabetes ([facility]     GERD (gastroesophageal reflux disease)     Hyperlipidemia     Hypertension     Hypothyroidism     Urinary incontinence        PAST SURGICAL HISTORY   Past Surgical History:   Procedure Laterality Date    CATARACT REMOVAL Bilateral     EYE SURGERY Left     TOOTH EXTRACTION         SOCIAL HISTORY   Tobacco:   reports that she has never smoked. She has never used smokeless tobacco.  Alcohol:   reports no history of alcohol use. Drugs:   reports no history of drug use.     FAMILY HISTORY   Family History   Problem Relation Age of Onset    Diabetes Mother     Heart Disease Mother     Thyroid Disease Mother     Cancer Father     Cancer Sister     Diabetes Brother        ALLERGIES AND DRUG REACTIONS   Allergies   Allergen Reactions    Colchicine Other (See Comments)     Chest pain and discomfort and throat and stomach pain - reported by patient 9/7/21    Gabapentin      \"they told me not to take it because it gives me CP\"       CURRENT MEDICATIONS   Current Outpatient Medications   Medication Sig Dispense Refill    levothyroxine (SYNTHROID) 125 MCG tablet TAKE 1 TABLET BY MOUTH ONCE DAILY 90 tablet 3    Liraglutide (VICTOZA) 18 MG/3ML SOPN SC injection inject 1.8 milligrams subcutaneously once daily 9 mL 3    metFORMIN (GLUCOPHAGE) 1000 MG tablet take 1 tablet by mouth twice a day with meals 180 tablet 3    glipiZIDE (GLUCOTROL) 5 MG tablet take 1 tablet by mouth WITH BREAKFAST AND 1 TABLET WITH DINNER 180 tablet 2    captopril (CAPOTEN) 50 MG tablet TAKE 1 TABLET BY MOUTH  TWICE DAILY 180 tablet 3    DULoxetine (CYMBALTA) 60 MG extended release capsule take 1 capsule by mouth twice a day 180 capsule 1    esomeprazole (NEXIUM) 40 MG delayed release capsule take 1 capsule by mouth once daily 90 capsule 1    citalopram (CELEXA) 20 MG tablet take 1 tablet by mouth once daily 90 tablet 1    oxybutynin (DITROPAN) 5 MG tablet take 1 tablet by mouth every morning then take 2 tablets at bedtime 270 tablet 1    magnesium oxide (MAG-OX) 400 MG tablet take 1 tablet by mouth once daily 90 tablet 1    FEROSUL 325 (65 Fe) MG tablet take 1 tablet by mouth once daily WITH BREAKFAST 90 tablet 3    Blood Glucose Monitoring Suppl (ONETOUCH VERIO) w/Device KIT Use to check blood sugars 4x daily 1 kit 0    blood glucose test strips (ASCENSIA AUTODISC VI;ONE TOUCH ULTRA TEST VI) strip 1 each by In Vitro route daily Use to check blood sugars 4x daily 150 each 6    OneTouch Delica Lancets 82K MISC Use to check blood sugars 4x daily 150 each 6    atorvastatin (LIPITOR) 20 MG tablet TAKE 1 TABLET BY MOUTH  DAILY 90 tablet 3    zinc gluconate 50 MG tablet Take 50 mg by mouth daily      Ascorbic Acid (VITAMIN C PO) Take 500 mg by mouth      Misc. Devices (ROLLATOR ULTRA-LIGHT) MISC 1 each by Does not apply route daily 1 each 0    aspirin 81 MG chewable tablet Take 1 tablet by mouth daily 30 tablet 3    Insulin Pen Needle (EASY TOUCH PEN NEEDLES) 31G X 5 MM MISC USE DAILY AS DIRECTED 200 each 3    cyanocobalamin ([employer] VITAMIN B12) 1000 MCG tablet Take 1 tablet by mouth daily 90 tablet 3    Handicap Placard MISC by Does not apply route DURATION: 5 years 1 each 0     No current facility-administered medications for this visit. Review of Systems  Constitutional: No fever, no chills, no diaphoresis, no generalized weakness. HEENT: No blurred vision, No sore throat, no ear pain, no hair loss  Neck: denied any neck swelling, difficulty swallowing,   Cardio-pulmonary: No CP, SOB or palpitation, No orthopnea or PND. No cough or wheezing. GI: No N/V/D, no constipation, No abdominal pain, no melena or hematochezia   : Denied any dysuria, hematuria, flank pain, discharge, or incontinence.    Skin: denied any rash, ulcer, Hirsute, or hyperpigmentation. MSK: denied any joint deformity, joint pain/swelling, muscle pain, or back pain. Neuro: no numbness, no tingling, no weakness, _    OBJECTIVE    /81   Pulse (!) 104   Ht 5' 2\" (1.575 m)   Wt 199 lb (90.3 kg)   SpO2 96%   BMI 36.40 kg/m²   BP Readings from Last 4 Encounters:   10/05/22 124/81   08/18/22 128/80   07/06/22 130/85   05/06/22 122/79     Wt Readings from Last 6 Encounters:   10/05/22 199 lb (90.3 kg)   08/18/22 198 lb (89.8 kg)   08/08/22 198 lb (89.8 kg)   07/06/22 198 lb (89.8 kg)   05/06/22 194 lb (88 kg)   03/31/22 197 lb (89.4 kg)       Physical examination:  General: awake alert, oriented x3, no abnormal position or movements. HEENT: normocephalic non-traumatic, no exophthalmos   Neck: supple, no LN enlargement, no thyromegaly, no thyroid tenderness, no JVD. Pulm: Clear equal air entry no added sounds, no wheezing or rhonchi    CVS: S1 + S2, no murmur, no heave. Dorsalis pedis pulse palpable   Abd: soft lax, no tenderness, no organomegaly, audible bowel sounds. Skin: warm, no lesions, no rash.  No callus, no Ulcers, No acanthosis nigricans  Musculoskeletal: No back tenderness, no kyphosis/scoliosis    Neuro: CN intact, Monofilament sensation normal  bilateral , muscle power normal  Psych: normal mood, and affect      Review of Laboratory Data:  I personally reviewed the following lab:  Lab Results   Component Value Date/Time    WBC 6.9 06/20/2022 01:55 PM    RBC 4.45 06/20/2022 01:55 PM    HGB 13.2 06/20/2022 01:55 PM    HCT 38.7 06/20/2022 01:55 PM    MCV 87.0 06/20/2022 01:55 PM    MCH 29.6 06/20/2022 01:55 PM    MCHC 34.0 06/20/2022 01:55 PM    RDW 16.3 (H) 06/20/2022 01:55 PM     06/20/2022 01:55 PM    MPV 7.2 (L) 06/20/2022 01:55 PM    GRANULOCYTES 75.8 06/20/2022 01:55 PM    BANDS 1 08/10/2021 03:31 PM      Lab Results   Component Value Date/Time     08/18/2022 11:46 AM    K 4.9 08/18/2022 11:46 AM    CO2 25 08/18/2022 11:46 AM    BUN 18 08/18/2022 11:46 AM    CREATININE 0.72 08/18/2022 11:46 AM    CALCIUM 9.7 08/18/2022 11:46 AM    LABGLOM >60 02/04/2016 10:55 AM    GFRAA >60 02/04/2016 10:55 AM      Lab Results   Component Value Date/Time    TSH 1.073 05/06/2022 02:13 PM    T4FREE 1.55 10/15/2019 02:35 PM     Lab Results   Component Value Date/Time    LABA1C 6.0 10/05/2022 02:42 PM    GLUCOSE 187 08/18/2022 11:46 AM    MALBCR see below 02/22/2021 09:52 AM    LABMICR 1.80 02/04/2016 10:55 AM    LABCREA 48 02/04/2016 10:55 AM     Lab Results   Component Value Date/Time    LABA1C 6.0 10/05/2022 02:42 PM    LABA1C 5.5 03/31/2022 02:05 PM    LABA1C 5.7 10/19/2021 01:53 PM     Lab Results   Component Value Date/Time    TRIG 237 05/06/2022 02:13 PM    HDL 39 05/06/2022 02:13 PM    LDLCALC 86 02/04/2016 10:55 AM    CHOL 134 05/06/2022 02:13 PM    CHOL 160 02/04/2016 10:55 AM     Lab Results   Component Value Date/Time    VITD25 50 08/18/2022 11:46 AM    VITD25 36.60 04/14/2016 01:57 PM       ASSESSMENT & RECOMMENDATIONS   Lencho Collazo, a 74 y.o.-old female seen in for the following issues       Assessment:      Diagnosis Orders   1. Type 2 diabetes mellitus with hyperglycemia, with long-term current use of insulin ([facility])  POCT glycosylated hemoglobin (Hb A1C)      2. Acquired hypothyroidism        3. Class 2 obesity due to excess calories without serious comorbidity with body mass index (BMI) of 36.0 to 36.9 in adult        4. Mixed hyperlipidemia        5. Essential hypertension        6. Type 2 diabetes mellitus with polyneuropathy ([address]            Plan:     1. Type 2 diabetes mellitus with hyperglycemia, with long-term current use of insulin ([facility] [address]   Patient's diabetes well controlled . Hemoglobin A1c 6%.   Continue glipizide dose to 5 mg 1 tablet twice daily   Continue metformin 1000 mg twice daily  Continue Victoza 1.8 mg daily  The patient was advised to check blood sugars 2 times a day   Discussed with patient A1c and blood sugar goals   Discussed lifestyle changes including diet and exercise with patient; recommended 150 minutes of moderate intensity exercise per week. 2. Acquired hypothyroidism   Continue levothyroxine 125 mcg 1 tablet once daily. Last thyroid function within normal limits. Patient taking on an empty stomach least 30 minutes before breakfast and without combination of other medications   3. Class 1 obesity due to excess calories without serious comorbidity with body mass index (BMI) of 36.0 to 36.9 in adult   Discussed lifestyle changes including diet and exercise with patient in depth. Also discussed with patient cardiovascular risk associated with obesity   4. Mixed hyperlipidemia   Continue statin. Counseled on the importance of statin therapy with diabetes   5. Essential hypertension   BP stable following with PCP   6. Type 2 diabetes mellitus with polyneuropathy ([facility] [address]   Counseled on optimal foot care         I personally spent greater than 30 minutes reviewing  external notes from PCP and other patient's care team providers, and personally interpreted labs associated with the above diagnosis. I also ordered labs to further assess and manage the above addressed medical conditions. Return in about 6 months (around 4/5/2023). The above issues were reviewed with the patient who understood and agreed with the plan. Thank you for allowing us to participate in the care of this patient. Please do not hesitate to contact us with any additional questions. Radha HUMPHREYS    [facility] Diabetes Care and Endocrinology   [address] [place] [address]   Phone: [phone]  Fax: [phone]  --------------------------------------------  An electronic signature was used to authenticate this note.  Radha HUMPHREYS on 10/5/2022 at 3:02 PM

## 2022-10-17 DIAGNOSIS — Z79.4 TYPE 2 DIABETES MELLITUS WITH HYPERGLYCEMIA, WITH LONG-TERM CURRENT USE OF INSULIN (HCC): ICD-10-CM

## 2022-10-17 DIAGNOSIS — E11.65 TYPE 2 DIABETES MELLITUS WITH HYPERGLYCEMIA, WITH LONG-TERM CURRENT USE OF INSULIN (HCC): ICD-10-CM

## 2022-10-18 RX ORDER — LANCETS 30 GAUGE
EACH MISCELLANEOUS
Qty: 200 EACH | Refills: 6 | Status: SHIPPED | OUTPATIENT
Start: 2022-10-18

## 2022-10-18 RX ORDER — BLOOD SUGAR DIAGNOSTIC
STRIP MISCELLANEOUS
Qty: 200 STRIP | Refills: 6 | Status: SHIPPED | OUTPATIENT
Start: 2022-10-18

## 2022-10-20 DIAGNOSIS — Z79.4 TYPE 2 DIABETES MELLITUS WITH HYPERGLYCEMIA, WITH LONG-TERM CURRENT USE OF INSULIN (HCC): ICD-10-CM

## 2022-10-20 DIAGNOSIS — E11.65 TYPE 2 DIABETES MELLITUS WITH HYPERGLYCEMIA, WITH LONG-TERM CURRENT USE OF INSULIN (HCC): ICD-10-CM

## 2022-10-21 RX ORDER — GLIPIZIDE 5 MG/1
TABLET ORAL
Qty: 180 TABLET | Refills: 3 | Status: SHIPPED | OUTPATIENT
Start: 2022-10-21

## 2022-10-31 DIAGNOSIS — E11.65 TYPE 2 DIABETES MELLITUS WITH HYPERGLYCEMIA, WITH LONG-TERM CURRENT USE OF INSULIN (HCC): ICD-10-CM

## 2022-10-31 DIAGNOSIS — Z79.4 TYPE 2 DIABETES MELLITUS WITH HYPERGLYCEMIA, WITH LONG-TERM CURRENT USE OF INSULIN (HCC): ICD-10-CM

## 2022-11-02 RX ORDER — LIRAGLUTIDE 6 MG/ML
INJECTION SUBCUTANEOUS
Qty: 9 ML | Refills: 3 | Status: SHIPPED | OUTPATIENT
Start: 2022-11-02

## 2022-11-17 DIAGNOSIS — E11.65 TYPE 2 DIABETES MELLITUS WITH HYPERGLYCEMIA, WITH LONG-TERM CURRENT USE OF INSULIN (HCC): ICD-10-CM

## 2022-11-17 DIAGNOSIS — Z79.4 TYPE 2 DIABETES MELLITUS WITH HYPERGLYCEMIA, WITH LONG-TERM CURRENT USE OF INSULIN (HCC): ICD-10-CM

## 2023-01-17 RX ORDER — LIRAGLUTIDE 6 MG/ML
INJECTION SUBCUTANEOUS
Qty: 9 ML | Refills: 3 | OUTPATIENT
Start: 2023-01-17

## 2023-02-22 DIAGNOSIS — E11.65 TYPE 2 DIABETES MELLITUS WITH HYPERGLYCEMIA, WITH LONG-TERM CURRENT USE OF INSULIN (HCC): ICD-10-CM

## 2023-02-22 DIAGNOSIS — Z79.4 TYPE 2 DIABETES MELLITUS WITH HYPERGLYCEMIA, WITH LONG-TERM CURRENT USE OF INSULIN (HCC): ICD-10-CM

## 2023-02-22 RX ORDER — LIRAGLUTIDE 6 MG/ML
INJECTION SUBCUTANEOUS
Qty: 9 ML | Refills: 5 | Status: SHIPPED | OUTPATIENT
Start: 2023-02-22

## 2023-03-09 DIAGNOSIS — E11.65 TYPE 2 DIABETES MELLITUS WITH HYPERGLYCEMIA, WITH LONG-TERM CURRENT USE OF INSULIN (HCC): ICD-10-CM

## 2023-03-09 DIAGNOSIS — Z79.4 TYPE 2 DIABETES MELLITUS WITH HYPERGLYCEMIA, WITH LONG-TERM CURRENT USE OF INSULIN (HCC): ICD-10-CM

## 2023-04-05 ENCOUNTER — OFFICE VISIT (OUTPATIENT)
Dept: ENDOCRINOLOGY | Age: 68
End: 2023-04-05
Payer: MEDICARE

## 2023-04-05 VITALS
DIASTOLIC BLOOD PRESSURE: 88 MMHG | BODY MASS INDEX: 37.73 KG/M2 | WEIGHT: 205 LBS | HEIGHT: 62 IN | SYSTOLIC BLOOD PRESSURE: 145 MMHG | OXYGEN SATURATION: 96 % | HEART RATE: 104 BPM

## 2023-04-05 DIAGNOSIS — E78.2 MIXED HYPERLIPIDEMIA: ICD-10-CM

## 2023-04-05 DIAGNOSIS — Z79.4 TYPE 2 DIABETES MELLITUS WITH HYPERGLYCEMIA, WITH LONG-TERM CURRENT USE OF INSULIN (HCC): Primary | ICD-10-CM

## 2023-04-05 DIAGNOSIS — E66.09 CLASS 2 OBESITY DUE TO EXCESS CALORIES WITHOUT SERIOUS COMORBIDITY WITH BODY MASS INDEX (BMI) OF 37.0 TO 37.9 IN ADULT: ICD-10-CM

## 2023-04-05 DIAGNOSIS — E03.9 ACQUIRED HYPOTHYROIDISM: ICD-10-CM

## 2023-04-05 DIAGNOSIS — I10 ESSENTIAL HYPERTENSION: ICD-10-CM

## 2023-04-05 DIAGNOSIS — E11.42 TYPE 2 DIABETES MELLITUS WITH POLYNEUROPATHY (HCC): ICD-10-CM

## 2023-04-05 DIAGNOSIS — E11.65 TYPE 2 DIABETES MELLITUS WITH HYPERGLYCEMIA, WITH LONG-TERM CURRENT USE OF INSULIN (HCC): Primary | ICD-10-CM

## 2023-04-05 LAB — HBA1C MFR BLD: 7.1 %

## 2023-04-05 PROCEDURE — G8400 PT W/DXA NO RESULTS DOC: HCPCS | Performed by: CLINICAL NURSE SPECIALIST

## 2023-04-05 PROCEDURE — G8417 CALC BMI ABV UP PARAM F/U: HCPCS | Performed by: CLINICAL NURSE SPECIALIST

## 2023-04-05 PROCEDURE — 99214 OFFICE O/P EST MOD 30 MIN: CPT | Performed by: CLINICAL NURSE SPECIALIST

## 2023-04-05 PROCEDURE — 3051F HG A1C>EQUAL 7.0%<8.0%: CPT | Performed by: CLINICAL NURSE SPECIALIST

## 2023-04-05 PROCEDURE — 3077F SYST BP >= 140 MM HG: CPT | Performed by: CLINICAL NURSE SPECIALIST

## 2023-04-05 PROCEDURE — 1090F PRES/ABSN URINE INCON ASSESS: CPT | Performed by: CLINICAL NURSE SPECIALIST

## 2023-04-05 PROCEDURE — 3079F DIAST BP 80-89 MM HG: CPT | Performed by: CLINICAL NURSE SPECIALIST

## 2023-04-05 PROCEDURE — 3017F COLORECTAL CA SCREEN DOC REV: CPT | Performed by: CLINICAL NURSE SPECIALIST

## 2023-04-05 PROCEDURE — 1036F TOBACCO NON-USER: CPT | Performed by: CLINICAL NURSE SPECIALIST

## 2023-04-05 PROCEDURE — G8427 DOCREV CUR MEDS BY ELIG CLIN: HCPCS | Performed by: CLINICAL NURSE SPECIALIST

## 2023-04-05 PROCEDURE — 2022F DILAT RTA XM EVC RTNOPTHY: CPT | Performed by: CLINICAL NURSE SPECIALIST

## 2023-04-05 PROCEDURE — 1123F ACP DISCUSS/DSCN MKR DOCD: CPT | Performed by: CLINICAL NURSE SPECIALIST

## 2023-04-05 PROCEDURE — 83036 HEMOGLOBIN GLYCOSYLATED A1C: CPT | Performed by: CLINICAL NURSE SPECIALIST

## 2023-04-05 RX ORDER — GLIPIZIDE 5 MG/1
TABLET ORAL
Qty: 270 TABLET | Refills: 3 | Status: SHIPPED | OUTPATIENT
Start: 2023-04-05

## 2023-04-05 NOTE — PROGRESS NOTES
pain.  Neuro: no numbness, no tingling, no weakness, _    OBJECTIVE    BP (!) 145/88   Pulse (!) 104   Ht 5' 2\" (1.575 m)   Wt 205 lb (93 kg)   SpO2 96%   BMI 37.49 kg/m²   BP Readings from Last 4 Encounters:   04/05/23 (!) 145/88   10/05/22 124/81   08/18/22 128/80   07/06/22 130/85     Wt Readings from Last 6 Encounters:   04/05/23 205 lb (93 kg)   10/05/22 199 lb (90.3 kg)   08/18/22 198 lb (89.8 kg)   08/08/22 198 lb (89.8 kg)   07/06/22 198 lb (89.8 kg)   05/06/22 194 lb (88 kg)       Physical examination:  General: awake alert, oriented x3, no abnormal position or movements. HEENT: normocephalic non-traumatic, no exophthalmos   Neck: supple, no LN enlargement, no thyromegaly, no thyroid tenderness, no JVD. Pulm: Clear equal air entry no added sounds, no wheezing or rhonchi    CVS: S1 + S2, no murmur, no heave. Dorsalis pedis pulse palpable   Abd: soft lax, no tenderness, no organomegaly, audible bowel sounds. Skin: warm, no lesions, no rash.  No callus, no Ulcers, No acanthosis nigricans  Musculoskeletal: No back tenderness, no kyphosis/scoliosis    Neuro: CN intact, Monofilament sensation normal  bilateral , muscle power normal  Psych: normal mood, and affect      Review of Laboratory Data:  I personally reviewed the following lab:  Lab Results   Component Value Date/Time    WBC 6.9 06/20/2022 01:55 PM    RBC 4.45 06/20/2022 01:55 PM    HGB 13.2 06/20/2022 01:55 PM    HCT 38.7 06/20/2022 01:55 PM    MCV 87.0 06/20/2022 01:55 PM    MCH 29.6 06/20/2022 01:55 PM    MCHC 34.0 06/20/2022 01:55 PM    RDW 16.3 (H) 06/20/2022 01:55 PM     06/20/2022 01:55 PM    MPV 7.2 (L) 06/20/2022 01:55 PM    GRANULOCYTES 75.8 06/20/2022 01:55 PM    BANDS 1 08/10/2021 03:31 PM      Lab Results   Component Value Date/Time     08/18/2022 11:46 AM    K 4.9 08/18/2022 11:46 AM    CO2 25 08/18/2022 11:46 AM    BUN 18 08/18/2022 11:46 AM    CREATININE 0.72 08/18/2022 11:46 AM    CALCIUM 9.7 08/18/2022 11:46 AM

## 2023-05-10 DIAGNOSIS — Z79.4 TYPE 2 DIABETES MELLITUS WITH HYPERGLYCEMIA, WITH LONG-TERM CURRENT USE OF INSULIN (HCC): Primary | ICD-10-CM

## 2023-05-10 DIAGNOSIS — E11.65 TYPE 2 DIABETES MELLITUS WITH HYPERGLYCEMIA, WITH LONG-TERM CURRENT USE OF INSULIN (HCC): Primary | ICD-10-CM

## 2023-05-10 RX ORDER — BLOOD-GLUCOSE METER
EACH MISCELLANEOUS
Qty: 1 KIT | Refills: 0 | Status: SHIPPED | OUTPATIENT
Start: 2023-05-10

## 2023-07-12 ENCOUNTER — OFFICE VISIT (OUTPATIENT)
Dept: ENDOCRINOLOGY | Age: 68
End: 2023-07-12
Payer: MEDICARE

## 2023-07-12 VITALS
WEIGHT: 205 LBS | SYSTOLIC BLOOD PRESSURE: 136 MMHG | RESPIRATION RATE: 18 BRPM | DIASTOLIC BLOOD PRESSURE: 79 MMHG | OXYGEN SATURATION: 94 % | HEIGHT: 62 IN | BODY MASS INDEX: 37.73 KG/M2 | HEART RATE: 96 BPM

## 2023-07-12 DIAGNOSIS — E03.9 ACQUIRED HYPOTHYROIDISM: ICD-10-CM

## 2023-07-12 DIAGNOSIS — E11.65 TYPE 2 DIABETES MELLITUS WITH HYPERGLYCEMIA, WITH LONG-TERM CURRENT USE OF INSULIN (HCC): Primary | ICD-10-CM

## 2023-07-12 DIAGNOSIS — E11.42 TYPE 2 DIABETES MELLITUS WITH POLYNEUROPATHY (HCC): ICD-10-CM

## 2023-07-12 DIAGNOSIS — E78.2 MIXED HYPERLIPIDEMIA: ICD-10-CM

## 2023-07-12 DIAGNOSIS — E66.09 CLASS 2 OBESITY DUE TO EXCESS CALORIES WITHOUT SERIOUS COMORBIDITY WITH BODY MASS INDEX (BMI) OF 37.0 TO 37.9 IN ADULT: ICD-10-CM

## 2023-07-12 DIAGNOSIS — I10 ESSENTIAL HYPERTENSION: ICD-10-CM

## 2023-07-12 DIAGNOSIS — Z79.4 TYPE 2 DIABETES MELLITUS WITH HYPERGLYCEMIA, WITH LONG-TERM CURRENT USE OF INSULIN (HCC): Primary | ICD-10-CM

## 2023-07-12 LAB — HBA1C MFR BLD: 7 %

## 2023-07-12 PROCEDURE — 3051F HG A1C>EQUAL 7.0%<8.0%: CPT | Performed by: CLINICAL NURSE SPECIALIST

## 2023-07-12 PROCEDURE — 83037 HB GLYCOSYLATED A1C HOME DEV: CPT | Performed by: CLINICAL NURSE SPECIALIST

## 2023-07-12 PROCEDURE — 3075F SYST BP GE 130 - 139MM HG: CPT | Performed by: CLINICAL NURSE SPECIALIST

## 2023-07-12 PROCEDURE — 1036F TOBACCO NON-USER: CPT | Performed by: CLINICAL NURSE SPECIALIST

## 2023-07-12 PROCEDURE — G8417 CALC BMI ABV UP PARAM F/U: HCPCS | Performed by: CLINICAL NURSE SPECIALIST

## 2023-07-12 PROCEDURE — 1090F PRES/ABSN URINE INCON ASSESS: CPT | Performed by: CLINICAL NURSE SPECIALIST

## 2023-07-12 PROCEDURE — 3017F COLORECTAL CA SCREEN DOC REV: CPT | Performed by: CLINICAL NURSE SPECIALIST

## 2023-07-12 PROCEDURE — G8427 DOCREV CUR MEDS BY ELIG CLIN: HCPCS | Performed by: CLINICAL NURSE SPECIALIST

## 2023-07-12 PROCEDURE — 3078F DIAST BP <80 MM HG: CPT | Performed by: CLINICAL NURSE SPECIALIST

## 2023-07-12 PROCEDURE — 99214 OFFICE O/P EST MOD 30 MIN: CPT | Performed by: CLINICAL NURSE SPECIALIST

## 2023-07-12 PROCEDURE — 1123F ACP DISCUSS/DSCN MKR DOCD: CPT | Performed by: CLINICAL NURSE SPECIALIST

## 2023-07-12 PROCEDURE — G8400 PT W/DXA NO RESULTS DOC: HCPCS | Performed by: CLINICAL NURSE SPECIALIST

## 2023-07-12 PROCEDURE — 2022F DILAT RTA XM EVC RTNOPTHY: CPT | Performed by: CLINICAL NURSE SPECIALIST

## 2023-07-12 NOTE — PROGRESS NOTES
[address] [facility] Department of Endocrinology Diabetes and Metabolism   [address] [address]   Phone: [phone]  Fax: [phone]    Date of Service: 7/12/2023    Primary Care Physician: Sandra Saucedo MD  Referring physician: No ref. provider found  Provider: Cheryl HUMPHREYS    Reason for the visit:  Type 2 DM       History of Present Illness: The history is provided by the patient. No  was used. Accuracy of the patient data is excellent. Sophie James is a very pleasant 74 y.o. female seen today for diabetes management     Sophie James was diagnosed with diabetes at age of 30    and currently on metformin 1000 mg twice daily, glipizide 10 mg a morning and 5mg  BID, Victoza 1.8 mg daily. Stop [place] in the past due to UTI. Stopped Actos November 2015  Steroid injection 1 month   Checks BG 2 times per day   Bg usually 100's-200's   Most recent A1c results summarized below  Lab Results   Component Value Date/Time    LABA1C 7.0 07/12/2023 02:28 PM    LABA1C 7.1 04/05/2023 02:26 PM    LABA1C 6.0 10/05/2022 02:42 PM     Patient reported no hypoglycemia      The patient has been mindful of what has been eating and following diabetic diet as encouraged    I reviewed current medications and the patient has no issues with diabetes medications   Last eye exam was 2022  , no h/o diabetic retinopathy  And also performs  own feet care  Microvascular complications:  No Retinopathy, Nephropathy.  +  Neuropathy   Macrovascular complications: no CAD, PVD, or Stroke    No Hx of pancreatitis   No HX of UTI/  She has had 3-4 yeast infections since starting Farxiga. None since stopping farxiga   No hx of MTC  No Hx of gastroparesis       Hypothyroidism-  1st dx at when she was in her 21's. Context:  Severe fatigue. Now on LT4 125 mcg 1 tablet daily. Does takes with other medication. Good compliance.   Pt complaining of denies related to thyroid   Lab

## 2023-07-13 DIAGNOSIS — Z79.4 TYPE 2 DIABETES MELLITUS WITH HYPERGLYCEMIA, WITH LONG-TERM CURRENT USE OF INSULIN (HCC): ICD-10-CM

## 2023-07-13 DIAGNOSIS — E11.65 TYPE 2 DIABETES MELLITUS WITH HYPERGLYCEMIA, WITH LONG-TERM CURRENT USE OF INSULIN (HCC): ICD-10-CM

## 2023-07-17 RX ORDER — PEN NEEDLE, DIABETIC 31 GX3/16"
NEEDLE, DISPOSABLE MISCELLANEOUS
Qty: 200 EACH | Refills: 3 | Status: SHIPPED | OUTPATIENT
Start: 2023-07-17

## 2023-08-15 DIAGNOSIS — Z79.4 TYPE 2 DIABETES MELLITUS WITH HYPERGLYCEMIA, WITH LONG-TERM CURRENT USE OF INSULIN (HCC): ICD-10-CM

## 2023-08-15 DIAGNOSIS — E11.65 TYPE 2 DIABETES MELLITUS WITH HYPERGLYCEMIA, WITH LONG-TERM CURRENT USE OF INSULIN (HCC): ICD-10-CM

## 2023-08-15 RX ORDER — GLIPIZIDE 5 MG/1
TABLET ORAL
Qty: 200 TABLET | Refills: 2 | Status: SHIPPED | OUTPATIENT
Start: 2023-08-15

## 2023-08-25 DIAGNOSIS — E11.65 TYPE 2 DIABETES MELLITUS WITH HYPERGLYCEMIA, WITH LONG-TERM CURRENT USE OF INSULIN (HCC): ICD-10-CM

## 2023-08-25 DIAGNOSIS — Z79.4 TYPE 2 DIABETES MELLITUS WITH HYPERGLYCEMIA, WITH LONG-TERM CURRENT USE OF INSULIN (HCC): ICD-10-CM

## 2023-08-29 RX ORDER — LIRAGLUTIDE 6 MG/ML
INJECTION SUBCUTANEOUS
Qty: 9 ML | Refills: 5 | Status: SHIPPED | OUTPATIENT
Start: 2023-08-29

## 2023-09-08 DIAGNOSIS — E11.65 TYPE 2 DIABETES MELLITUS WITH HYPERGLYCEMIA, WITH LONG-TERM CURRENT USE OF INSULIN (HCC): ICD-10-CM

## 2023-09-08 DIAGNOSIS — Z79.4 TYPE 2 DIABETES MELLITUS WITH HYPERGLYCEMIA, WITH LONG-TERM CURRENT USE OF INSULIN (HCC): ICD-10-CM

## 2023-09-11 RX ORDER — LANCETS 30 GAUGE
EACH MISCELLANEOUS
Qty: 200 EACH | Refills: 6 | Status: SHIPPED | OUTPATIENT
Start: 2023-09-11

## 2023-10-16 DIAGNOSIS — Z79.4 TYPE 2 DIABETES MELLITUS WITH HYPERGLYCEMIA, WITH LONG-TERM CURRENT USE OF INSULIN (HCC): ICD-10-CM

## 2023-10-16 DIAGNOSIS — E11.65 TYPE 2 DIABETES MELLITUS WITH HYPERGLYCEMIA, WITH LONG-TERM CURRENT USE OF INSULIN (HCC): ICD-10-CM

## 2023-10-18 RX ORDER — GLIPIZIDE 5 MG/1
TABLET ORAL
Qty: 270 TABLET | Refills: 0 | Status: SHIPPED | OUTPATIENT
Start: 2023-10-18

## 2023-10-29 DIAGNOSIS — E11.65 TYPE 2 DIABETES MELLITUS WITH HYPERGLYCEMIA, WITH LONG-TERM CURRENT USE OF INSULIN (HCC): ICD-10-CM

## 2023-10-29 DIAGNOSIS — Z79.4 TYPE 2 DIABETES MELLITUS WITH HYPERGLYCEMIA, WITH LONG-TERM CURRENT USE OF INSULIN (HCC): ICD-10-CM

## 2023-10-30 RX ORDER — BLOOD-GLUCOSE METER
EACH MISCELLANEOUS
Qty: 200 STRIP | Refills: 6 | Status: SHIPPED | OUTPATIENT
Start: 2023-10-30

## 2023-12-26 DIAGNOSIS — Z79.4 TYPE 2 DIABETES MELLITUS WITH HYPERGLYCEMIA, WITH LONG-TERM CURRENT USE OF INSULIN (HCC): ICD-10-CM

## 2023-12-26 DIAGNOSIS — E11.65 TYPE 2 DIABETES MELLITUS WITH HYPERGLYCEMIA, WITH LONG-TERM CURRENT USE OF INSULIN (HCC): ICD-10-CM

## 2023-12-27 RX ORDER — GLIPIZIDE 5 MG/1
TABLET ORAL
Qty: 270 TABLET | Refills: 3 | OUTPATIENT
Start: 2023-12-27

## 2024-01-02 ENCOUNTER — TELEPHONE (OUTPATIENT)
Dept: ENDOCRINOLOGY | Age: 69
End: 2024-01-02

## 2024-01-02 RX ORDER — DULAGLUTIDE 1.5 MG/.5ML
1.5 INJECTION, SOLUTION SUBCUTANEOUS WEEKLY
Qty: 12 ADJUSTABLE DOSE PRE-FILLED PEN SYRINGE | Refills: 1 | Status: SHIPPED | OUTPATIENT
Start: 2024-01-02

## 2024-01-02 NOTE — TELEPHONE ENCOUNTER
Patient called and said her insurance will no longer cover the victoza. Tried doing a pa for the victoza but she needs to try byisabelreonm, betta, trulicity or ozempic.

## 2024-01-10 ENCOUNTER — TELEPHONE (OUTPATIENT)
Dept: ENDOCRINOLOGY | Age: 69
End: 2024-01-10

## 2024-01-10 NOTE — TELEPHONE ENCOUNTER
----- Message from Bernadette Adams sent at 1/3/2024 10:54 AM EST -----  Regarding: Victoza   Contact: [phone]  My insurance will not cover my Victoza starting January 1st 2024. How do I get it covered or is the another medication that I can take. Can someone call me this coming week so we can get this resolved.  Thank you Bernadette Adams

## 2024-02-27 ENCOUNTER — OFFICE VISIT (OUTPATIENT)
Dept: ENDOCRINOLOGY | Age: 69
End: 2024-02-27
Payer: MEDICARE

## 2024-02-27 VITALS
SYSTOLIC BLOOD PRESSURE: 129 MMHG | BODY MASS INDEX: 37.54 KG/M2 | OXYGEN SATURATION: 95 % | HEART RATE: 108 BPM | RESPIRATION RATE: 16 BRPM | HEIGHT: 62 IN | DIASTOLIC BLOOD PRESSURE: 79 MMHG | WEIGHT: 204 LBS

## 2024-02-27 DIAGNOSIS — E78.2 MIXED HYPERLIPIDEMIA: ICD-10-CM

## 2024-02-27 DIAGNOSIS — E03.9 ACQUIRED HYPOTHYROIDISM: ICD-10-CM

## 2024-02-27 DIAGNOSIS — E66.09 CLASS 2 OBESITY DUE TO EXCESS CALORIES WITHOUT SERIOUS COMORBIDITY WITH BODY MASS INDEX (BMI) OF 37.0 TO 37.9 IN ADULT: ICD-10-CM

## 2024-02-27 DIAGNOSIS — Z79.4 TYPE 2 DIABETES MELLITUS WITH HYPERGLYCEMIA, WITH LONG-TERM CURRENT USE OF INSULIN (HCC): Primary | ICD-10-CM

## 2024-02-27 DIAGNOSIS — E11.42 TYPE 2 DIABETES MELLITUS WITH POLYNEUROPATHY (HCC): ICD-10-CM

## 2024-02-27 DIAGNOSIS — E11.65 TYPE 2 DIABETES MELLITUS WITH HYPERGLYCEMIA, WITH LONG-TERM CURRENT USE OF INSULIN (HCC): Primary | ICD-10-CM

## 2024-02-27 LAB — HBA1C MFR BLD: 7.8 %

## 2024-02-27 PROCEDURE — 83036 HEMOGLOBIN GLYCOSYLATED A1C: CPT | Performed by: CLINICAL NURSE SPECIALIST

## 2024-02-27 PROCEDURE — 2022F DILAT RTA XM EVC RTNOPTHY: CPT | Performed by: CLINICAL NURSE SPECIALIST

## 2024-02-27 PROCEDURE — G8400 PT W/DXA NO RESULTS DOC: HCPCS | Performed by: CLINICAL NURSE SPECIALIST

## 2024-02-27 PROCEDURE — 3078F DIAST BP <80 MM HG: CPT | Performed by: CLINICAL NURSE SPECIALIST

## 2024-02-27 PROCEDURE — G8484 FLU IMMUNIZE NO ADMIN: HCPCS | Performed by: CLINICAL NURSE SPECIALIST

## 2024-02-27 PROCEDURE — 3051F HG A1C>EQUAL 7.0%<8.0%: CPT | Performed by: CLINICAL NURSE SPECIALIST

## 2024-02-27 PROCEDURE — 3017F COLORECTAL CA SCREEN DOC REV: CPT | Performed by: CLINICAL NURSE SPECIALIST

## 2024-02-27 PROCEDURE — 99214 OFFICE O/P EST MOD 30 MIN: CPT | Performed by: CLINICAL NURSE SPECIALIST

## 2024-02-27 PROCEDURE — 3074F SYST BP LT 130 MM HG: CPT | Performed by: CLINICAL NURSE SPECIALIST

## 2024-02-27 PROCEDURE — 1036F TOBACCO NON-USER: CPT | Performed by: CLINICAL NURSE SPECIALIST

## 2024-02-27 PROCEDURE — G8417 CALC BMI ABV UP PARAM F/U: HCPCS | Performed by: CLINICAL NURSE SPECIALIST

## 2024-02-27 PROCEDURE — G8427 DOCREV CUR MEDS BY ELIG CLIN: HCPCS | Performed by: CLINICAL NURSE SPECIALIST

## 2024-02-27 PROCEDURE — 1123F ACP DISCUSS/DSCN MKR DOCD: CPT | Performed by: CLINICAL NURSE SPECIALIST

## 2024-02-27 PROCEDURE — 1090F PRES/ABSN URINE INCON ASSESS: CPT | Performed by: CLINICAL NURSE SPECIALIST

## 2024-02-27 RX ORDER — GLIPIZIDE 5 MG/1
TABLET ORAL
Qty: 360 TABLET | Refills: 3 | Status: SHIPPED | OUTPATIENT
Start: 2024-02-27

## 2024-02-27 RX ORDER — DULAGLUTIDE 3 MG/.5ML
3 INJECTION, SOLUTION SUBCUTANEOUS WEEKLY
Qty: 12 ADJUSTABLE DOSE PRE-FILLED PEN SYRINGE | Refills: 1 | Status: SHIPPED | OUTPATIENT
Start: 2024-02-27

## 2024-02-27 NOTE — PROGRESS NOTES
[facility]  [facility] Department of Endocrinology Diabetes and Metabolism   [address], [place], [place], [address]   Phone: [phone]  Fax: [phone]    Date of Service: 2/27/2024    Primary Care Physician: Alex Salgado MD  Referring physician: No ref. provider found  Provider: Chin HUMPHREYS    Reason for the visit:  Type 2 DM       History of Present Illness:  The history is provided by the patient. No  was used. Accuracy of the patient data is excellent.  Bernadette Adams is a very pleasant 68 y.o. female seen today for diabetes management     Bernadette Adams was diagnosed with diabetes at age of 37    and currently on metformin 1000 mg twice daily, glipizide 10 mg a morning and 10mg  BID, Trulciity 1.5,  Stop Farxiga in the past due to UTI.  Stopped Actos November 2015  Steroid injection 1/2024   Checks BG 2 times per day   Bg usually 100's-200's   Most recent A1c results summarized below  Lab Results   Component Value Date/Time    LABA1C 7.8 02/27/2024 01:58 PM    LABA1C 7.0 07/12/2023 02:28 PM    LABA1C 7.1 04/05/2023 02:26 PM     Patient reported no hypoglycemia      The patient has been mindful of what has been eating and following diabetic diet as encouraged    I reviewed current medications and the patient has no issues with diabetes medications   Last eye exam was 8/2023  , no h/o diabetic retinopathy  And also performs  own feet care  Microvascular complications:  No Retinopathy, Nephropathy.  +  Neuropathy   Macrovascular complications: no CAD, PVD, or Stroke    No Hx of pancreatitis   No HX of UTI/  She has had 3-4 yeast infections since starting Farxiga.  None since stopping farxiga   No hx of MTC  No Hx of gastroparesis       Hypothyroidism-  1st dx at when she was in her 20's.  Context:  Severe fatigue.  Now on LT4 125 mcg 1 tablet daily.  Does takes with other medication.  Good compliance.  Pt complaining of denies related to thyroid

## 2024-03-17 DIAGNOSIS — Z79.4 TYPE 2 DIABETES MELLITUS WITH HYPERGLYCEMIA, WITH LONG-TERM CURRENT USE OF INSULIN (HCC): ICD-10-CM

## 2024-03-17 DIAGNOSIS — E11.65 TYPE 2 DIABETES MELLITUS WITH HYPERGLYCEMIA, WITH LONG-TERM CURRENT USE OF INSULIN (HCC): ICD-10-CM

## 2024-04-05 LAB
ALBUMIN SERPL-MCNC: 4.3 G/DL
ALP BLD-CCNC: NORMAL U/L
ALT SERPL-CCNC: NORMAL U/L
ANION GAP SERPL CALCULATED.3IONS-SCNC: NORMAL MMOL/L
AST SERPL-CCNC: NORMAL U/L
BILIRUB SERPL-MCNC: NORMAL MG/DL
BUN BLDV-MCNC: NORMAL MG/DL
CALCIUM SERPL-MCNC: 9.6 MG/DL
CHLORIDE BLD-SCNC: 98 MMOL/L
CHOLESTEROL, TOTAL: 142 MG/DL
CHOLESTEROL/HDL RATIO: NORMAL
CO2: NORMAL
CREAT SERPL-MCNC: 0.67 MG/DL
CREATININE URINE: NORMAL
EGFR: 96
GLUCOSE BLD-MCNC: NORMAL MG/DL
HDLC SERPL-MCNC: 41 MG/DL (ref 35–70)
LDL CHOLESTEROL CALCULATED: 68 MG/DL (ref 0–160)
MICROALBUMIN/CREAT 24H UR: NORMAL MG/G{CREAT}
NONHDLC SERPL-MCNC: NORMAL MG/DL
POTASSIUM SERPL-SCNC: 5 MMOL/L
SODIUM BLD-SCNC: 136 MMOL/L
T4 FREE: 1.2
TOTAL PROTEIN: NORMAL
TRIGL SERPL-MCNC: 252 MG/DL
TSH SERPL DL<=0.05 MIU/L-ACNC: 2.14 UIU/ML
VLDLC SERPL CALC-MCNC: NORMAL MG/DL

## 2024-04-09 DIAGNOSIS — Z79.4 TYPE 2 DIABETES MELLITUS WITH HYPERGLYCEMIA, WITH LONG-TERM CURRENT USE OF INSULIN (HCC): ICD-10-CM

## 2024-04-09 DIAGNOSIS — E11.65 TYPE 2 DIABETES MELLITUS WITH HYPERGLYCEMIA, WITH LONG-TERM CURRENT USE OF INSULIN (HCC): ICD-10-CM

## 2024-04-09 DIAGNOSIS — E03.9 ACQUIRED HYPOTHYROIDISM: ICD-10-CM

## 2024-04-28 NOTE — PROGRESS NOTES
Subjective   Patient ID: Alma Kelly is a 68 y.o. female who presents for No chief complaint on file..  HPI  This 68-year-old female is being seen in the office today for hearing evaluation.  She does have a history of sensorineural hearing loss and she has worn hearing aids in the past.  She was followed previously by  [facility] and previous audiograms not available to review.  She denies any difficulties with vertigo has no significant complaints regarding tinnitus.  She is on Trulicity for diabetes in addition to Glucotrol.  She denies vertigo and has had no difficulties with headaches.  She did fall causing head trauma earlier this year and required some stitches for laceration.  She was seen in the emergency room and a CT scan of the head was done which the patient reports as being normal.  Review of Systems  A 12 point ROS has been reviewed and are negative for complaint except what is stated in the history of present illness and/or past medical history as noted in the EMR     Active Ambulatory Problems     Diagnosis Date Noted    Acquired hypothyroidism 02/04/2016    Acute viral pericarditis ([facility]) 06/25/2021    Anxiety and depression 06/29/2021    Atypical squamous cells of undetermined significance on cytologic smear of cervix (ASC-US) 10/12/2016    Cervical high risk HPV (human papillomavirus) test positive 01/16/2023    Cervical intraepithelial neoplasia grade 1 01/16/2023    Chest pain 06/25/2021    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) 03/19/2018    DDD (degenerative disc disease), cervical 11/17/2023    Essential hypertension 02/04/2016    Gastroesophageal reflux disease 11/15/2022    MDD (recurrent major depressive disorder) in remission (CMS-HCC) 11/17/2023    Hypertriglyceridemia 05/01/2024    Morbidly obese (Multi) 03/25/2020    Other chronic pain 11/17/2023    Overactive bladder 11/15/2022    Pericardial effusion ([facility]) 06/29/2021    Urinary  incontinence, mixed 11/17/2023    Vitamin D deficiency 11/17/2023     Resolved Ambulatory Problems     Diagnosis Date Noted    No Resolved Ambulatory Problems     Past Medical History:   Diagnosis Date    Bipolar disorder, unspecified (Multi)     Personal history of other diseases of the circulatory system     Personal history of other diseases of the digestive system     Personal history of other endocrine, nutritional and metabolic disease     Personal history of other endocrine, nutritional and metabolic disease     Personal history of other endocrine, nutritional and metabolic disease     Personal history of other mental and behavioral disorders     Personal history of other mental and behavioral disorders         Current Outpatient Medications:     atorvastatin (Lipitor) 20 mg tablet, Take 1 tablet (20 mg) by mouth once every 24 hours., Disp: , Rfl:     captopril (Capoten) 50 mg tablet, Take 1 tablet (50 mg) by mouth every 12 hours., Disp: , Rfl:     citalopram (CeleXA) 20 mg tablet, Take 1 tablet (20 mg) by mouth once every 24 hours., Disp: , Rfl:     cyclobenzaprine (Flexeril) 10 mg tablet, Take 1 tablet (10 mg) by mouth once daily as needed., Disp: , Rfl:     DULoxetine (Cymbalta) 60 mg DR capsule, Take 1 capsule (60 mg) by mouth every 12 hours., Disp: , Rfl:     esomeprazole (NexIUM) 40 mg DR capsule, Take 1 capsule (40 mg) by mouth every 12 hours., Disp: , Rfl:     ferrous sulfate, 325 mg ferrous sulfate, tablet, Take 1 tablet by mouth once daily with breakfast., Disp: , Rfl:     Flonase Allergy Relief 50 mcg/actuation nasal spray, Administer 1 spray into each nostril once every 24 hours., Disp: , Rfl:     glipiZIDE (Glucotrol) 5 mg tablet, Take 1 tablet (5 mg) by mouth every 12 hours., Disp: , Rfl:     ibuprofen 400 mg tablet, Take 1 tablet (400 mg) by mouth every 12 hours., Disp: , Rfl:     levothyroxine (Synthroid, Levoxyl) 125 mcg tablet, Take 1 tablet (125 mcg) by mouth once every 24 hours., Disp: ,  "Rfl:     magnesium oxide (Mag-Ox) 400 mg (241.3 mg magnesium) tablet, Take 1 tablet (400 mg) by mouth once daily., Disp: , Rfl:     metFORMIN (Glucophage) 1,000 mg tablet, Take 1 tablet (1,000 mg) by mouth every 12 hours., Disp: , Rfl:     oxybutynin (Ditropan) 5 mg tablet, Take 1 tablet (5 mg) by mouth., Disp: , Rfl:     Trulicity 1.5 mg/0.5 mL pen injector injection, Inject 1.5 mg under the skin 1 (one) time per week., Disp: , Rfl:     Zetia 10 mg tablet, Take 1 tablet (10 mg) by mouth once every 24 hours., Disp: , Rfl:     cholecalciferol (Vitamin D-3) 5,000 Units tablet, Take 1 tablet (5,000 Units) by mouth once daily., Disp: , Rfl:     cyanocobalamin, vitamin B-12, (Vitamin B-12) 1,000 mcg tablet extended release, Take 1 tablet (1,000 mcg) by mouth once every 24 hours., Disp: , Rfl:     HYDROcodone-acetaminophen (Norco) 5-325 mg tablet, Take 1 tablet by mouth every 6 hours if needed., Disp: , Rfl:     naproxen (Naprosyn) 500 mg tablet, Take 1 tablet (500 mg) by mouth., Disp: , Rfl:    Past Surgical History:   Procedure Laterality Date    OTHER SURGICAL HISTORY  03/06/2020    Eye surgery    OTHER SURGICAL HISTORY  03/06/2020    Cataract surgery    OTHER SURGICAL HISTORY  03/06/2020    Oral surgery        Social History     Tobacco Use    Smoking status: Never    Smokeless tobacco: Never   Substance Use Topics    Alcohol use: Not Currently      Allergies   Allergen Reactions    Colchicine Other and Unknown     Chest pain and discomfort and throat and stomach pain - reported by patient 9/7/21    Chest pain and discomfort and throat and stomach pain - reported by patient 9/7/21   Chest pain and discomfort and throat and stomach pain - reported by patient 9/7/21    Gabapentin Unknown     \"they told me not to take it because it gives me CP\"    \"they told me not to take it because it gives me CP\"   \"they told me not to take it because it gives me CP\"    Nsaids (Non-Steroidal Anti-Inflammatory Drug) Unknown     Chest " "pain. Tolerates Ibuprofen.      Height 1.575 m (5' 2\"), weight 92.5 kg (204 lb).   Objective   Physical Exam  Examination:    CONSTITUTIONAL: Alert, in no acute distress, normal pitch/clarity of voice, well-developed, well-nourished, cooperative.  HEAD/FACE: Normocephalic, atraumatic, no tenderness over the sinuses, facial strength and movement symmetric.    SKIN: Good turgor, no rashes, no suspicious lesions, in the head and neck.    EYES: Both eyes have normal extraocular movements with no nystagmus, pupils are equal and reactive to light and accommodation, conjunctiva is clear.    EARS: Both ears are negative for external skin abnormalities, external auditory canals are without lesions or signs of inflammation, tympanic membranes are intact with some surface sclerotic changes ,  no effusions are seen, light reflexes normal, no mastoid tenderness is noted to palpation, objective hearing is intact.    NOSE: No external skin lesions are noted, nares are patent, septum is intact and noninflamed, nasal turbinates are normal in appearance, sinuses are nontender to palpation bilaterally, no internal lesions or polyps are noted, no discharge is noted.    OROPHARYNX/ORAL CAVITY: Mucous membranes of the oropharynx and the oral cavity proper are without lesions or ulcerations, tongue mobility is normal and no lesions are noted, gingiva and alveolar mucosa is intact without lesions, oral mucosa is moist, muscular movement of the palate and gag reflex are normal.    NECK: No lymphadenopathy is palpated, neck is supple with full range of motion, thyroid is without swelling or tenderness, trachea is midline, no neck masses are noted.    Lymphatics: No cervical adenopathy or supraclavicular adenopathy noted to palpation.    HEART/VASCULAR: No jugular venous distention is noted, carotid pulsations are intact with a regular rate and rhythm noted,    PULMONARY: Good air movement with normal inspiratory/expiratory effort is noted, " no audible wheezing is appreciated.    NEUROLOGIC: Alert and oriented, cranial nerves are grossly intact, gait is normal, sensation in the head and neck is intact,    PSYCH: oriented to person, place and time, normal mood and affect.    EXTREMITIES: No motor dysfunction of the upper and lower extremity is noted.    Her audiogram today on the right ear revealed evidence for a mild hearing loss up through 2000 Hz then a moderate to moderately severe loss is noted in the upper frequencies of sound.  On the right-hand side there is similar findings with some asymmetry noted at 500 Hz and at 2000 Hz left lower than right where an overall moderate hearing loss is noted from 500 through 8000 Hz except for mild loss at 1000 Hz.  Discriminate scores are 100% bilaterally at 60 dB presentation level on the right 80 dB on the left tympanograms are normal stapedial reflexes were normal.    CT head wo IV contrast 01/09/2024    Narrative  * * *Final Report* * *    DATE OF EXAM: Jan 9 2024 11:20AM    [facility]   [id number]  -  CT BRAIN WO IVCON   / ACCESSION #  [id number]    PROCEDURE REASON: Head trauma, moderate-severe    * * * * Physician Interpretation * * * *    EXAMINATION: CT BRAIN WO IVCON    CLINICAL HISTORY: Pain.  Head injury.    TECHNIQUE:  Serial axial images without IV contrast were obtained from  the vertex to the foramen magnum.  MQ:  [id number]    CT Radiation dose: Integrated Dose-Length Product (DLP) for this visit =  1217  mGy*cm  CT Dose Reduction Employed: Automated exposure control(AEC) and iterative  recon    COMPARISON: 09/05/2017.      RESULT:     (topogram) images: No additional findings.    Post-operative change: None.    Acute change: No evidence of an acute infarct or other acute parenchymal  process.    Hemorrhage: No evidence of acute intracranial hemorrhage. ECASS  hemorrhagic transformation score: Not Applicable    Mass Lesion / Mass Effect: There is no evidence of an intracranial mass  or extraaxial fluid  collection. No significant mass effect.    Chronic change: Minimal chronic white matter changes.    Parenchyma: There is no significant volume loss. The brain parenchyma is  otherwise within normal limits for age.    Ventricles: The ventricles are within normal limits of size and  configuration for age.    Paranasal sinuses and skull base: The visualized paranasal sinuses are  grossly clear. There is extracranial soft tissue swelling with a probable  scalp hematoma in the left parietal region.  No skull fracture is  identified.    Impression  IMPRESSION:    Scalp injury as described in the left parietal region.  No acute  intracranial process identified.              : EDILMA  Transcribe Date/Time: Jan 9 2024 11:35A    Dictated by : SOLO ANAND MD    This examination was interpreted and the report reviewed and  electronically signed by:  SOLO ANAND MD on Jan 9 2024 11:39AM  EST   Assessment/Plan   Problem List Items Addressed This Visit             ICD-10-CM    Morbidly obese (Multi) E66.01     Other Visit Diagnoses         Codes    Sensorineural hearing loss (SNHL) of both ears    -  Primary H90.3    Asymmetrical sensorineural hearing loss     H90.3    History of diabetes mellitus     Z86.39          The audiological findings were discussed with the patient. Given the asymmetry of the hearing levels, the possibility of an acoustic neuroma was discussed. Options including observation and monitoring with serial audiograms,  scheduling an ABR or MRI scan for further evaluation were discussed. The implications of finding an acoustic neuroma are also discussed. Following prolonged discussion we opted to proceed with scheduling an auditory brainstem response test which in conjunction with the recent CT scan should be adequate to initially check on this asymmetry.  I did mention to her that MRI scanning would be preferable but if old audiograms are reviewed showing similar asymmetry with these test  being normal hearing should be then followed yearly and a scan done if there is further asymmetry developing.  An MRI scan would also be advised if there is some abnormality on the ABR.  She will  be contacted with the results of that test and a yearly audiogram would be advised.    This patient is advised to follow up with their PCP for all other health care issues and treatment. Dictation was done with dragon transcription and errors in spelling  and diction are possible.    Yrn Henry DMD, MD 04/28/24 3:29 PM

## 2024-05-01 ENCOUNTER — CLINICAL SUPPORT (OUTPATIENT)
Dept: AUDIOLOGY | Facility: CLINIC | Age: 69
End: 2024-05-01
Payer: COMMERCIAL

## 2024-05-01 ENCOUNTER — OFFICE VISIT (OUTPATIENT)
Dept: OTOLARYNGOLOGY | Facility: CLINIC | Age: 69
End: 2024-05-01
Payer: COMMERCIAL

## 2024-05-01 VITALS — BODY MASS INDEX: 37.54 KG/M2 | WEIGHT: 204 LBS | HEIGHT: 62 IN

## 2024-05-01 DIAGNOSIS — Z86.39 HISTORY OF DIABETES MELLITUS: ICD-10-CM

## 2024-05-01 DIAGNOSIS — H90.3 SENSORINEURAL HEARING LOSS (SNHL) OF BOTH EARS: Primary | ICD-10-CM

## 2024-05-01 DIAGNOSIS — H90.3 ASYMMETRICAL SENSORINEURAL HEARING LOSS: ICD-10-CM

## 2024-05-01 DIAGNOSIS — E66.01 MORBIDLY OBESE (MULTI): ICD-10-CM

## 2024-05-01 PROBLEM — E11.69 COMBINED HYPERLIPIDEMIA ASSOCIATED WITH TYPE 2 DIABETES MELLITUS (MULTI): Status: ACTIVE | Noted: 2018-03-19

## 2024-05-01 PROBLEM — F32.A ANXIETY AND DEPRESSION: Status: ACTIVE | Noted: 2021-06-29

## 2024-05-01 PROBLEM — N87.0 CERVICAL INTRAEPITHELIAL NEOPLASIA GRADE 1: Status: ACTIVE | Noted: 2023-01-16

## 2024-05-01 PROBLEM — I31.39 PERICARDIAL EFFUSION (HHS-HCC): Status: ACTIVE | Noted: 2021-06-29

## 2024-05-01 PROBLEM — E78.2 COMBINED HYPERLIPIDEMIA ASSOCIATED WITH TYPE 2 DIABETES MELLITUS (MULTI): Status: ACTIVE | Noted: 2018-03-19

## 2024-05-01 PROBLEM — K21.9 GASTROESOPHAGEAL REFLUX DISEASE: Status: ACTIVE | Noted: 2022-11-15

## 2024-05-01 PROBLEM — I30.1 ACUTE VIRAL PERICARDITIS (HHS-HCC): Status: ACTIVE | Noted: 2021-06-25

## 2024-05-01 PROBLEM — G89.29 OTHER CHRONIC PAIN: Status: ACTIVE | Noted: 2023-11-17

## 2024-05-01 PROBLEM — M50.30 DDD (DEGENERATIVE DISC DISEASE), CERVICAL: Status: ACTIVE | Noted: 2023-11-17

## 2024-05-01 PROBLEM — R07.9 CHEST PAIN: Status: ACTIVE | Noted: 2021-06-25

## 2024-05-01 PROBLEM — R87.810 CERVICAL HIGH RISK HPV (HUMAN PAPILLOMAVIRUS) TEST POSITIVE: Status: ACTIVE | Noted: 2023-01-16

## 2024-05-01 PROBLEM — E78.1 HYPERTRIGLYCERIDEMIA: Status: ACTIVE | Noted: 2024-05-01

## 2024-05-01 PROBLEM — F33.40 MDD (RECURRENT MAJOR DEPRESSIVE DISORDER) IN REMISSION (CMS-HCC): Status: ACTIVE | Noted: 2023-11-17

## 2024-05-01 PROBLEM — F41.9 ANXIETY AND DEPRESSION: Status: ACTIVE | Noted: 2021-06-29

## 2024-05-01 PROBLEM — N39.46 URINARY INCONTINENCE, MIXED: Status: ACTIVE | Noted: 2023-11-17

## 2024-05-01 PROBLEM — E55.9 VITAMIN D DEFICIENCY: Status: ACTIVE | Noted: 2023-11-17

## 2024-05-01 PROBLEM — N32.81 OVERACTIVE BLADDER: Status: ACTIVE | Noted: 2022-11-15

## 2024-05-01 PROCEDURE — 1036F TOBACCO NON-USER: CPT | Performed by: OTOLARYNGOLOGY

## 2024-05-01 PROCEDURE — 1159F MED LIST DOCD IN RCRD: CPT | Performed by: OTOLARYNGOLOGY

## 2024-05-01 PROCEDURE — 1160F RVW MEDS BY RX/DR IN RCRD: CPT | Performed by: OTOLARYNGOLOGY

## 2024-05-01 PROCEDURE — 99204 OFFICE O/P NEW MOD 45 MIN: CPT | Performed by: OTOLARYNGOLOGY

## 2024-05-01 PROCEDURE — 92557 COMPREHENSIVE HEARING TEST: CPT | Performed by: AUDIOLOGIST

## 2024-05-01 PROCEDURE — 4010F ACE/ARB THERAPY RXD/TAKEN: CPT | Performed by: OTOLARYNGOLOGY

## 2024-05-01 PROCEDURE — 92550 TYMPANOMETRY & REFLEX THRESH: CPT | Performed by: AUDIOLOGIST

## 2024-05-01 RX ORDER — LEVOTHYROXINE SODIUM 125 UG/1
125 TABLET ORAL EVERY 24 HOURS
COMMUNITY
Start: 2022-09-19

## 2024-05-01 RX ORDER — TIZANIDINE 4 MG/1
4 TABLET ORAL EVERY 8 HOURS PRN
COMMUNITY
Start: 2023-07-23 | End: 2024-05-01 | Stop reason: ALTCHOICE

## 2024-05-01 RX ORDER — DULOXETIN HYDROCHLORIDE 60 MG/1
60 CAPSULE, DELAYED RELEASE ORAL EVERY 12 HOURS
COMMUNITY
Start: 2022-07-13

## 2024-05-01 RX ORDER — FLUTICASONE PROPIONATE 50 UG/1
1 SPRAY, METERED NASAL EVERY 24 HOURS
COMMUNITY
Start: 2023-10-30

## 2024-05-01 RX ORDER — CITALOPRAM 20 MG/1
20 TABLET, FILM COATED ORAL EVERY 24 HOURS
COMMUNITY
Start: 2022-06-30

## 2024-05-01 RX ORDER — OXYBUTYNIN CHLORIDE 5 MG/1
5 TABLET ORAL
COMMUNITY
Start: 2022-06-30

## 2024-05-01 RX ORDER — ACETAMINOPHEN 500 MG
5000 TABLET ORAL DAILY
COMMUNITY

## 2024-05-01 RX ORDER — TRAMADOL HYDROCHLORIDE AND ACETAMINOPHEN 37.5; 325 MG/1; MG/1
1 TABLET, FILM COATED ORAL EVERY 6 HOURS PRN
COMMUNITY
Start: 2023-07-07 | End: 2024-05-01 | Stop reason: ALTCHOICE

## 2024-05-01 RX ORDER — MULTIVIT-MIN/IRON/FOLIC ACID/K 18-600-40
CAPSULE ORAL
COMMUNITY
End: 2024-05-01 | Stop reason: ALTCHOICE

## 2024-05-01 RX ORDER — LANOLIN ALCOHOL/MO/W.PET/CERES
400 CREAM (GRAM) TOPICAL
COMMUNITY
Start: 2022-05-16

## 2024-05-01 RX ORDER — EZETIMIBE 10 MG
10 TABLET ORAL EVERY 24 HOURS
COMMUNITY
Start: 2024-04-18

## 2024-05-01 RX ORDER — LIRAGLUTIDE 6 MG/ML
INJECTION SUBCUTANEOUS
COMMUNITY
Start: 2022-07-08 | End: 2024-05-01 | Stop reason: ALTCHOICE

## 2024-05-01 RX ORDER — ACETAMINOPHEN, DEXTROMETHORPHAN HBR, DOXYLAMINE SUCCINATE, PHENYLEPHRINE HCL 650; 20; 12.5; 1 MG/30ML; MG/30ML; MG/30ML; MG/30ML
1 SOLUTION ORAL EVERY 24 HOURS
COMMUNITY

## 2024-05-01 RX ORDER — HYDROCODONE BITARTRATE AND ACETAMINOPHEN 5; 325 MG/1; MG/1
1 TABLET ORAL EVERY 6 HOURS PRN
COMMUNITY

## 2024-05-01 RX ORDER — NAPROXEN 500 MG/1
500 TABLET ORAL
COMMUNITY

## 2024-05-01 RX ORDER — ESOMEPRAZOLE MAGNESIUM 40 MG/1
40 CAPSULE, DELAYED RELEASE ORAL EVERY 12 HOURS
COMMUNITY
Start: 2022-07-13

## 2024-05-01 RX ORDER — GUAIFENESIN 600 MG/1
600 TABLET, EXTENDED RELEASE ORAL EVERY 12 HOURS
COMMUNITY
End: 2024-05-01 | Stop reason: ALTCHOICE

## 2024-05-01 RX ORDER — CAPTOPRIL 50 MG/1
50 TABLET ORAL EVERY 12 HOURS
COMMUNITY
Start: 2022-10-05

## 2024-05-01 RX ORDER — FERROUS SULFATE 325(65) MG
325 TABLET ORAL
COMMUNITY
Start: 2023-03-06

## 2024-05-01 RX ORDER — GLIPIZIDE 5 MG/1
5 TABLET ORAL EVERY 12 HOURS
COMMUNITY
Start: 2024-02-27

## 2024-05-01 RX ORDER — ATORVASTATIN CALCIUM 20 MG/1
20 TABLET, FILM COATED ORAL EVERY 24 HOURS
COMMUNITY
Start: 2023-04-24

## 2024-05-01 RX ORDER — ZINC GLUCONATE 50 MG
50 TABLET ORAL DAILY
COMMUNITY
End: 2024-05-01 | Stop reason: ALTCHOICE

## 2024-05-01 RX ORDER — METFORMIN HYDROCHLORIDE 1000 MG/1
1000 TABLET ORAL EVERY 12 HOURS
COMMUNITY
Start: 2022-06-27

## 2024-05-01 RX ORDER — DULAGLUTIDE 1.5 MG/.5ML
0.5 INJECTION, SOLUTION SUBCUTANEOUS
COMMUNITY
Start: 2024-01-02

## 2024-05-01 RX ORDER — IBUPROFEN 400 MG/1
400 TABLET ORAL EVERY 12 HOURS
COMMUNITY
Start: 2023-12-01

## 2024-05-01 RX ORDER — VENLAFAXINE 37.5 MG/1
37.5 TABLET ORAL 2 TIMES DAILY
COMMUNITY
Start: 2023-09-14 | End: 2024-05-01 | Stop reason: ALTCHOICE

## 2024-05-01 RX ORDER — CYCLOBENZAPRINE HCL 10 MG
10 TABLET ORAL DAILY PRN
COMMUNITY
Start: 2024-04-13

## 2024-05-01 NOTE — PROGRESS NOTES
COMPREHENSIVE AUDIOMETRIC EVALUATION      Name:  Alma Kelly  :  1955  Age:  68 y.o.  Date of Evaluation:  24   Referring Provider:  Dr. Henry     History:  Ms. eKlly was seen today for an evaluation of hearing.  Patient reported she had previously worn hearing aids, binaurally, though had concerns her hearing sensitivity has decreased.  When asked, patient denied otalgia and tinnitus    See audiometric evaluation at end of this report or scanned under media tab    OTOSCOPY:       Right Ear: Clear canal with abnormal appearance to TM       Left Ear: Clear canal    226 Hz TYMPANOMETRY:       Right Ear: Type A: normal peak pressure, compliance, and ear canal volume, consistent with middle ear function within normal limits       Left Ear: Type A: normal peak pressure, compliance, and ear canal volume, consistent with middle ear function within normal limits    AUDIOMETRIC EVALUATION (Phones):       Right Ear: Mild through 2000 Hz sloping to Severe, Sensorineural hearing loss                 Left Ear: Mild sloping to Moderately severe, Sensorineural hearing loss           Test technique:  Standard Audiometry  Reliability:   fair    SPEECH RECOGNITION THRESHOLD:       Right Ear:  25 dBHL in good agreement with PTA       Left Ear:  40 dBHL in good agreement with PTA    WORD RECOGNITION:       Right Ear:  excellent (100%) at elevated presentation level       Left Ear:  excellent (100%) at elevated presentation level    IPSILATERAL ACOUSTIC REFLEXES:       Right Ear:   - 2000 Hz       Left Ear:     - 2000 Hz    CONTRALATERAL REFLEXES:       Probe Right (Stimulus Left):   - 2000 Hz       Probe Left (Stimulus Right):     - 2000 Hz    NOTE: Reflexes within normal limits in all conditions are consistent with patient's hearing sensitivity and inconsistent with retrocochlear involvement    DISCUSSION:   Discussed results and recommendations with patient.  Questions were addressed  and the patient was encouraged to contact our department should concerns arise.    RECOMMENDATIONS:  -Recommend patient return should concerns for changes in hearing sensitivity arise or as medically indicated.   - Recommend patient pursue hearing aids through her insurance    Maral Ruiz, CCC-A     Appt: 2:30 - 3:00 PM

## 2024-05-10 ENCOUNTER — APPOINTMENT (OUTPATIENT)
Dept: AUDIOLOGY | Facility: CLINIC | Age: 69
End: 2024-05-10
Payer: COMMERCIAL

## 2024-07-11 ENCOUNTER — OFFICE VISIT (OUTPATIENT)
Dept: ENDOCRINOLOGY | Age: 69
End: 2024-07-11
Payer: MEDICARE

## 2024-07-11 VITALS
HEIGHT: 62 IN | OXYGEN SATURATION: 94 % | HEART RATE: 87 BPM | SYSTOLIC BLOOD PRESSURE: 115 MMHG | DIASTOLIC BLOOD PRESSURE: 72 MMHG | WEIGHT: 201 LBS | BODY MASS INDEX: 36.99 KG/M2

## 2024-07-11 DIAGNOSIS — E11.65 TYPE 2 DIABETES MELLITUS WITH HYPERGLYCEMIA, WITH LONG-TERM CURRENT USE OF INSULIN (HCC): Primary | ICD-10-CM

## 2024-07-11 DIAGNOSIS — E66.09 CLASS 2 OBESITY DUE TO EXCESS CALORIES WITHOUT SERIOUS COMORBIDITY WITH BODY MASS INDEX (BMI) OF 37.0 TO 37.9 IN ADULT: ICD-10-CM

## 2024-07-11 DIAGNOSIS — E11.42 TYPE 2 DIABETES MELLITUS WITH POLYNEUROPATHY (HCC): ICD-10-CM

## 2024-07-11 DIAGNOSIS — E78.2 MIXED HYPERLIPIDEMIA: ICD-10-CM

## 2024-07-11 DIAGNOSIS — E03.9 ACQUIRED HYPOTHYROIDISM: ICD-10-CM

## 2024-07-11 DIAGNOSIS — I10 ESSENTIAL HYPERTENSION: ICD-10-CM

## 2024-07-11 DIAGNOSIS — Z79.4 TYPE 2 DIABETES MELLITUS WITH HYPERGLYCEMIA, WITH LONG-TERM CURRENT USE OF INSULIN (HCC): Primary | ICD-10-CM

## 2024-07-11 LAB — HBA1C MFR BLD: 6.6 %

## 2024-07-11 PROCEDURE — 3017F COLORECTAL CA SCREEN DOC REV: CPT | Performed by: CLINICAL NURSE SPECIALIST

## 2024-07-11 PROCEDURE — 3074F SYST BP LT 130 MM HG: CPT | Performed by: CLINICAL NURSE SPECIALIST

## 2024-07-11 PROCEDURE — 3078F DIAST BP <80 MM HG: CPT | Performed by: CLINICAL NURSE SPECIALIST

## 2024-07-11 PROCEDURE — G8417 CALC BMI ABV UP PARAM F/U: HCPCS | Performed by: CLINICAL NURSE SPECIALIST

## 2024-07-11 PROCEDURE — 2022F DILAT RTA XM EVC RTNOPTHY: CPT | Performed by: CLINICAL NURSE SPECIALIST

## 2024-07-11 PROCEDURE — 1036F TOBACCO NON-USER: CPT | Performed by: CLINICAL NURSE SPECIALIST

## 2024-07-11 PROCEDURE — G8400 PT W/DXA NO RESULTS DOC: HCPCS | Performed by: CLINICAL NURSE SPECIALIST

## 2024-07-11 PROCEDURE — 1090F PRES/ABSN URINE INCON ASSESS: CPT | Performed by: CLINICAL NURSE SPECIALIST

## 2024-07-11 PROCEDURE — 83036 HEMOGLOBIN GLYCOSYLATED A1C: CPT | Performed by: CLINICAL NURSE SPECIALIST

## 2024-07-11 PROCEDURE — 1123F ACP DISCUSS/DSCN MKR DOCD: CPT | Performed by: CLINICAL NURSE SPECIALIST

## 2024-07-11 PROCEDURE — 3051F HG A1C>EQUAL 7.0%<8.0%: CPT | Performed by: CLINICAL NURSE SPECIALIST

## 2024-07-11 PROCEDURE — 99214 OFFICE O/P EST MOD 30 MIN: CPT | Performed by: CLINICAL NURSE SPECIALIST

## 2024-07-11 PROCEDURE — G8427 DOCREV CUR MEDS BY ELIG CLIN: HCPCS | Performed by: CLINICAL NURSE SPECIALIST

## 2024-07-11 RX ORDER — DULAGLUTIDE 3 MG/.5ML
3 INJECTION, SOLUTION SUBCUTANEOUS WEEKLY
Qty: 12 ADJUSTABLE DOSE PRE-FILLED PEN SYRINGE | Refills: 1 | Status: SHIPPED | OUTPATIENT
Start: 2024-07-11

## 2024-07-11 NOTE — PROGRESS NOTES
[facility]  [facility] Department of Endocrinology Diabetes and Metabolism   [address], [place], [place], [address]   Phone: [phone]  Fax: [phone]    Date of Service: 7/11/2024    Primary Care Physician: Alex Salgado MD  Referring physician: No ref. provider found  Provider: Chin HUMPHREYS    Reason for the visit:  Type 2 DM       History of Present Illness:  The history is provided by the patient. No  was used. Accuracy of the patient data is excellent.  Bernadette Adams is a very pleasant 68 y.o. female seen today for diabetes management     Bernadette Adams was diagnosed with diabetes at age of 37    and currently on metformin 1000 mg twice daily, glipizide 10 mg a morning and 10mg  BID, Trulciity 3 mg weekly ,  Stop Farxiga in the past due to UTI.  Stopped Actos November 2015  Steroid injection 1/2024   Checks BG 1 times per day   Bg usually mid to upper 100's   Most recent A1c results summarized below  Lab Results   Component Value Date/Time    LABA1C 6.6 07/11/2024 02:23 PM    LABA1C 7.8 02/27/2024 01:58 PM    LABA1C 7.0 07/12/2023 02:28 PM     Patient reported no hypoglycemia      The patient has been mindful of what has been eating and following diabetic diet as encouraged    I reviewed current medications and the patient has no issues with diabetes medications   Last eye exam was 5/2024  , no h/o diabetic retinopathy  And also performs  own feet care  Microvascular complications:  No Retinopathy, Nephropathy.  +  Neuropathy   Macrovascular complications: no CAD, PVD, or Stroke    No Hx of pancreatitis   No HX of UTI/  She has had 3-4 yeast infections since starting Farxiga.  None since stopping farxiga   No hx of MTC  No Hx of gastroparesis       Hypothyroidism-  1st dx at when she was in her 20's.  Context:  Severe fatigue.  Now on LT4 125 mcg 1 tablet daily.  Does takes with other medication.  Good compliance.  Pt complaining of denies

## 2024-07-15 ENCOUNTER — TELEPHONE (OUTPATIENT)
Dept: ENDOCRINOLOGY | Age: 69
End: 2024-07-15

## 2024-07-15 NOTE — TELEPHONE ENCOUNTER
----- Message from Bernadette Adams sent at 7/12/2024  3:51 PM EDT -----  Regarding: Trulicity  Contact: [phone]  The pharmacy reached out to me to tell me that they’re having a problem getting the 3.0 in the Trulicity shot pens and need the prescription changed to the 1.5 Trulicity shot pens taking 2 of them once a week. Could please change. Thank you.      P.S. tell Chin I’m take 1.5 pens and will up to 2 x a week with what I have.

## 2024-08-01 DIAGNOSIS — E11.65 TYPE 2 DIABETES MELLITUS WITH HYPERGLYCEMIA, WITH LONG-TERM CURRENT USE OF INSULIN (HCC): Primary | ICD-10-CM

## 2024-08-01 DIAGNOSIS — Z79.4 TYPE 2 DIABETES MELLITUS WITH HYPERGLYCEMIA, WITH LONG-TERM CURRENT USE OF INSULIN (HCC): Primary | ICD-10-CM

## 2024-08-01 NOTE — TELEPHONE ENCOUNTER
Pts insurance will not cover 2 of the 1.5 of Trulicity to replace the 3mg that is out of stock.  Sending the 1.5 for patient so she can have something while she is waiting for the 3mg to be back in stock.

## 2024-09-04 DIAGNOSIS — E11.65 TYPE 2 DIABETES MELLITUS WITH HYPERGLYCEMIA, WITH LONG-TERM CURRENT USE OF INSULIN (HCC): Primary | ICD-10-CM

## 2024-09-04 DIAGNOSIS — Z79.4 TYPE 2 DIABETES MELLITUS WITH HYPERGLYCEMIA, WITH LONG-TERM CURRENT USE OF INSULIN (HCC): Primary | ICD-10-CM

## 2024-09-04 RX ORDER — UBIQUINOL 100 MG
CAPSULE ORAL
Qty: 200 EACH | Refills: 2 | Status: SHIPPED
Start: 2024-09-04 | End: 2024-09-04 | Stop reason: SDUPTHER

## 2024-09-04 RX ORDER — UBIQUINOL 100 MG
CAPSULE ORAL
Qty: 200 EACH | Refills: 2 | Status: SHIPPED | OUTPATIENT
Start: 2024-09-04

## 2024-09-09 DIAGNOSIS — E11.65 TYPE 2 DIABETES MELLITUS WITH HYPERGLYCEMIA, WITH LONG-TERM CURRENT USE OF INSULIN (HCC): ICD-10-CM

## 2024-09-09 DIAGNOSIS — Z79.4 TYPE 2 DIABETES MELLITUS WITH HYPERGLYCEMIA, WITH LONG-TERM CURRENT USE OF INSULIN (HCC): ICD-10-CM

## 2024-09-10 RX ORDER — LANCETS 30 GAUGE
EACH MISCELLANEOUS
Qty: 200 EACH | Refills: 3 | Status: SHIPPED | OUTPATIENT
Start: 2024-09-10

## 2024-09-12 DIAGNOSIS — E11.65 TYPE 2 DIABETES MELLITUS WITH HYPERGLYCEMIA, WITH LONG-TERM CURRENT USE OF INSULIN (HCC): ICD-10-CM

## 2024-09-12 DIAGNOSIS — Z79.4 TYPE 2 DIABETES MELLITUS WITH HYPERGLYCEMIA, WITH LONG-TERM CURRENT USE OF INSULIN (HCC): ICD-10-CM

## 2024-09-12 RX ORDER — UBIQUINOL 100 MG
CAPSULE ORAL
Qty: 400 EACH | Refills: 3 | Status: SHIPPED | OUTPATIENT
Start: 2024-09-12

## 2025-02-25 DIAGNOSIS — E11.65 TYPE 2 DIABETES MELLITUS WITH HYPERGLYCEMIA, WITH LONG-TERM CURRENT USE OF INSULIN (HCC): ICD-10-CM

## 2025-02-25 DIAGNOSIS — Z79.4 TYPE 2 DIABETES MELLITUS WITH HYPERGLYCEMIA, WITH LONG-TERM CURRENT USE OF INSULIN (HCC): ICD-10-CM

## 2025-02-25 RX ORDER — GLIPIZIDE 5 MG/1
TABLET ORAL
Qty: 360 TABLET | Refills: 0 | Status: SHIPPED | OUTPATIENT
Start: 2025-02-25

## 2025-03-13 DIAGNOSIS — E11.65 TYPE 2 DIABETES MELLITUS WITH HYPERGLYCEMIA, WITH LONG-TERM CURRENT USE OF INSULIN (HCC): ICD-10-CM

## 2025-03-13 DIAGNOSIS — Z79.4 TYPE 2 DIABETES MELLITUS WITH HYPERGLYCEMIA, WITH LONG-TERM CURRENT USE OF INSULIN (HCC): ICD-10-CM

## 2025-05-08 ENCOUNTER — OFFICE VISIT (OUTPATIENT)
Dept: ENDOCRINOLOGY | Age: 70
End: 2025-05-08
Payer: MEDICARE

## 2025-05-08 ENCOUNTER — TELEPHONE (OUTPATIENT)
Dept: ENDOCRINOLOGY | Age: 70
End: 2025-05-08

## 2025-05-08 VITALS
BODY MASS INDEX: 35.37 KG/M2 | SYSTOLIC BLOOD PRESSURE: 123 MMHG | OXYGEN SATURATION: 98 % | DIASTOLIC BLOOD PRESSURE: 80 MMHG | WEIGHT: 193.4 LBS | TEMPERATURE: 97.8 F | HEART RATE: 102 BPM

## 2025-05-08 DIAGNOSIS — Z79.4 TYPE 2 DIABETES MELLITUS WITH HYPERGLYCEMIA, WITH LONG-TERM CURRENT USE OF INSULIN (HCC): Primary | ICD-10-CM

## 2025-05-08 DIAGNOSIS — I10 ESSENTIAL HYPERTENSION: ICD-10-CM

## 2025-05-08 DIAGNOSIS — E66.812 CLASS 2 OBESITY DUE TO EXCESS CALORIES WITHOUT SERIOUS COMORBIDITY WITH BODY MASS INDEX (BMI) OF 36.0 TO 36.9 IN ADULT: ICD-10-CM

## 2025-05-08 DIAGNOSIS — E11.42 TYPE 2 DIABETES MELLITUS WITH POLYNEUROPATHY (HCC): ICD-10-CM

## 2025-05-08 DIAGNOSIS — E03.9 ACQUIRED HYPOTHYROIDISM: ICD-10-CM

## 2025-05-08 DIAGNOSIS — E11.65 TYPE 2 DIABETES MELLITUS WITH HYPERGLYCEMIA, WITH LONG-TERM CURRENT USE OF INSULIN (HCC): Primary | ICD-10-CM

## 2025-05-08 DIAGNOSIS — E66.09 CLASS 2 OBESITY DUE TO EXCESS CALORIES WITHOUT SERIOUS COMORBIDITY WITH BODY MASS INDEX (BMI) OF 36.0 TO 36.9 IN ADULT: ICD-10-CM

## 2025-05-08 LAB — HBA1C MFR BLD: 7 %

## 2025-05-08 PROCEDURE — 3051F HG A1C>EQUAL 7.0%<8.0%: CPT | Performed by: CLINICAL NURSE SPECIALIST

## 2025-05-08 PROCEDURE — 3017F COLORECTAL CA SCREEN DOC REV: CPT | Performed by: CLINICAL NURSE SPECIALIST

## 2025-05-08 PROCEDURE — 3079F DIAST BP 80-89 MM HG: CPT | Performed by: CLINICAL NURSE SPECIALIST

## 2025-05-08 PROCEDURE — G8427 DOCREV CUR MEDS BY ELIG CLIN: HCPCS | Performed by: CLINICAL NURSE SPECIALIST

## 2025-05-08 PROCEDURE — 2022F DILAT RTA XM EVC RTNOPTHY: CPT | Performed by: CLINICAL NURSE SPECIALIST

## 2025-05-08 PROCEDURE — G8417 CALC BMI ABV UP PARAM F/U: HCPCS | Performed by: CLINICAL NURSE SPECIALIST

## 2025-05-08 PROCEDURE — 1036F TOBACCO NON-USER: CPT | Performed by: CLINICAL NURSE SPECIALIST

## 2025-05-08 PROCEDURE — 99214 OFFICE O/P EST MOD 30 MIN: CPT | Performed by: CLINICAL NURSE SPECIALIST

## 2025-05-08 PROCEDURE — 1123F ACP DISCUSS/DSCN MKR DOCD: CPT | Performed by: CLINICAL NURSE SPECIALIST

## 2025-05-08 PROCEDURE — 1090F PRES/ABSN URINE INCON ASSESS: CPT | Performed by: CLINICAL NURSE SPECIALIST

## 2025-05-08 PROCEDURE — G8400 PT W/DXA NO RESULTS DOC: HCPCS | Performed by: CLINICAL NURSE SPECIALIST

## 2025-05-08 PROCEDURE — 1159F MED LIST DOCD IN RCRD: CPT | Performed by: CLINICAL NURSE SPECIALIST

## 2025-05-08 PROCEDURE — 83036 HEMOGLOBIN GLYCOSYLATED A1C: CPT | Performed by: CLINICAL NURSE SPECIALIST

## 2025-05-08 PROCEDURE — G2211 COMPLEX E/M VISIT ADD ON: HCPCS | Performed by: CLINICAL NURSE SPECIALIST

## 2025-05-08 PROCEDURE — 3074F SYST BP LT 130 MM HG: CPT | Performed by: CLINICAL NURSE SPECIALIST

## 2025-05-08 RX ORDER — LIRAGLUTIDE 6 MG/ML
1.2 INJECTION SUBCUTANEOUS DAILY
Qty: 6 ML | Refills: 1 | Status: SHIPPED | OUTPATIENT
Start: 2025-05-08

## 2025-05-08 NOTE — PROGRESS NOTES
[facility] [employer]  [facility] Department of Endocrinology Diabetes and Metabolism   [address], [place], [address]   Phone: [phone]  Fax: [phone]    Date of Service: 5/8/2025    Primary Care Physician: Alex Salgado MD  Referring physician: No ref. provider found  Provider: Chin HUMPHREYS    Reason for the visit:  Type 2 DM       History of Present Illness:  The history is provided by the patient. No  was used. Accuracy of the patient data is excellent.  Bernadette Adams is a very pleasant 69 y.o. female seen today for diabetes management     Bernadette Adams was diagnosed with diabetes at age of 37    and currently on metformin 1000 mg twice daily, glipizide 10 mg a morning and 10mg  BID, Trulciity 3 mg weekly ,  Stop Farxiga in the past due to UTI.  Stopped Actos November 2015  Pt complaining of GI upset with trulciity   Steroid injection 1/2024   Checks BG 1 times per day   Bg usually 150-210  Most recent A1c results summarized below  Lab Results   Component Value Date/Time    LABA1C 7.0 05/08/2025 02:08 PM    LABA1C 6.6 07/11/2024 02:23 PM    LABA1C 7.8 02/27/2024 01:58 PM     Patient reported no hypoglycemia      The patient has been mindful of what has been eating and following diabetic diet as encouraged    I reviewed current medications and the patient has no issues with diabetes medications   Last eye exam was 5/2024  , no h/o diabetic retinopathy  And also performs  own feet care  Microvascular complications:  No Retinopathy, Nephropathy.  +  Neuropathy   Macrovascular complications: no CAD, PVD, or Stroke    No Hx of pancreatitis   No HX of UTI/  She has had 3-4 yeast infections since starting Farxiga.  None since stopping farxiga   No hx of MTC  No Hx of gastroparesis       Hypothyroidism-  1st dx at when she was in her 20's.  Context:  Severe fatigue.  Now on LT4 125 mcg 1 tablet daily.  Does takes with other medication.  Good compliance.

## 2025-05-21 ENCOUNTER — TELEPHONE (OUTPATIENT)
Dept: ENDOCRINOLOGY | Age: 70
End: 2025-05-21

## 2025-05-21 NOTE — TELEPHONE ENCOUNTER
Pt cannot get victoza she has to try the covered meds and fail after 120 for each.  She would like to go back on trulicity can you please send what you would like her to be on now.

## 2025-05-22 DIAGNOSIS — Z79.4 TYPE 2 DIABETES MELLITUS WITH HYPERGLYCEMIA, WITH LONG-TERM CURRENT USE OF INSULIN (HCC): ICD-10-CM

## 2025-05-22 DIAGNOSIS — E11.65 TYPE 2 DIABETES MELLITUS WITH HYPERGLYCEMIA, WITH LONG-TERM CURRENT USE OF INSULIN (HCC): ICD-10-CM

## 2025-05-22 RX ORDER — GLIPIZIDE 5 MG/1
TABLET ORAL
Qty: 360 TABLET | Refills: 1 | Status: SHIPPED | OUTPATIENT
Start: 2025-05-22

## 2025-06-05 ENCOUNTER — TELEPHONE (OUTPATIENT)
Dept: ENDOCRINOLOGY | Age: 70
End: 2025-06-05

## 2025-06-07 NOTE — TELEPHONE ENCOUNTER
Please call pt and inform her I have reviewed labs.  TRG elevated,  Encouraged diet and exercise.  TFT's are normal.  Sodium and potassium were abnormal.  I advise pt to follow up with PCP regarding this

## 2025-06-08 DIAGNOSIS — E11.65 TYPE 2 DIABETES MELLITUS WITH HYPERGLYCEMIA, WITH LONG-TERM CURRENT USE OF INSULIN (HCC): ICD-10-CM

## 2025-06-08 DIAGNOSIS — Z79.4 TYPE 2 DIABETES MELLITUS WITH HYPERGLYCEMIA, WITH LONG-TERM CURRENT USE OF INSULIN (HCC): ICD-10-CM

## 2025-07-05 ENCOUNTER — PATIENT MESSAGE (OUTPATIENT)
Dept: ENDOCRINOLOGY | Age: 70
End: 2025-07-05

## 2025-07-08 DIAGNOSIS — E11.65 TYPE 2 DIABETES MELLITUS WITH HYPERGLYCEMIA, WITH LONG-TERM CURRENT USE OF INSULIN (HCC): Primary | ICD-10-CM

## 2025-07-08 DIAGNOSIS — Z79.4 TYPE 2 DIABETES MELLITUS WITH HYPERGLYCEMIA, WITH LONG-TERM CURRENT USE OF INSULIN (HCC): Primary | ICD-10-CM

## 2025-07-08 RX ORDER — LANCETS
EACH MISCELLANEOUS
Qty: 300 EACH | Refills: 1 | Status: SHIPPED | OUTPATIENT
Start: 2025-07-08

## 2025-07-08 RX ORDER — BLOOD SUGAR DIAGNOSTIC
STRIP MISCELLANEOUS
Qty: 300 EACH | Refills: 1 | Status: SHIPPED | OUTPATIENT
Start: 2025-07-08

## 2025-07-08 RX ORDER — BLOOD-GLUCOSE METER
EACH MISCELLANEOUS
Qty: 1 KIT | Refills: 0 | Status: SHIPPED | OUTPATIENT
Start: 2025-07-08

## 2025-08-06 DIAGNOSIS — Z79.4 TYPE 2 DIABETES MELLITUS WITH HYPERGLYCEMIA, WITH LONG-TERM CURRENT USE OF INSULIN (HCC): ICD-10-CM

## 2025-08-06 DIAGNOSIS — E11.65 TYPE 2 DIABETES MELLITUS WITH HYPERGLYCEMIA, WITH LONG-TERM CURRENT USE OF INSULIN (HCC): ICD-10-CM

## 2025-08-07 RX ORDER — BLOOD-GLUCOSE METER
EACH MISCELLANEOUS
Qty: 1 KIT | Refills: 0 | Status: SHIPPED | OUTPATIENT
Start: 2025-08-07

## 2025-08-26 DIAGNOSIS — E11.42 TYPE 2 DIABETES MELLITUS WITH POLYNEUROPATHY (HCC): ICD-10-CM

## 2025-08-26 DIAGNOSIS — E11.65 TYPE 2 DIABETES MELLITUS WITH HYPERGLYCEMIA, WITH LONG-TERM CURRENT USE OF INSULIN (HCC): Primary | ICD-10-CM

## 2025-08-26 DIAGNOSIS — Z79.4 TYPE 2 DIABETES MELLITUS WITH HYPERGLYCEMIA, WITH LONG-TERM CURRENT USE OF INSULIN (HCC): Primary | ICD-10-CM

## 2025-08-27 RX ORDER — TIRZEPATIDE 5 MG/.5ML
5 INJECTION, SOLUTION SUBCUTANEOUS WEEKLY
Qty: 2 ML | Refills: 5 | Status: SHIPPED | OUTPATIENT
Start: 2025-08-27

## 2025-08-30 DIAGNOSIS — Z79.4 TYPE 2 DIABETES MELLITUS WITH HYPERGLYCEMIA, WITH LONG-TERM CURRENT USE OF INSULIN (HCC): ICD-10-CM

## 2025-08-30 DIAGNOSIS — E11.65 TYPE 2 DIABETES MELLITUS WITH HYPERGLYCEMIA, WITH LONG-TERM CURRENT USE OF INSULIN (HCC): ICD-10-CM

## 2025-09-03 RX ORDER — BLOOD-GLUCOSE METER
EACH MISCELLANEOUS
Qty: 1 KIT | Refills: 0 | Status: SHIPPED | OUTPATIENT
Start: 2025-09-03

## 2025-09-04 DIAGNOSIS — E11.65 TYPE 2 DIABETES MELLITUS WITH HYPERGLYCEMIA, WITH LONG-TERM CURRENT USE OF INSULIN (HCC): ICD-10-CM

## 2025-09-04 DIAGNOSIS — Z79.4 TYPE 2 DIABETES MELLITUS WITH HYPERGLYCEMIA, WITH LONG-TERM CURRENT USE OF INSULIN (HCC): ICD-10-CM

## 2025-09-04 RX ORDER — LANCETS 30 GAUGE
EACH MISCELLANEOUS
Qty: 200 EACH | Refills: 3 | Status: SHIPPED | OUTPATIENT
Start: 2025-09-04